# Patient Record
Sex: MALE | Race: WHITE
[De-identification: names, ages, dates, MRNs, and addresses within clinical notes are randomized per-mention and may not be internally consistent; named-entity substitution may affect disease eponyms.]

---

## 2017-01-09 ENCOUNTER — HOSPITAL ENCOUNTER (OUTPATIENT)
Dept: HOSPITAL 17 - PLAB | Age: 82
End: 2017-01-09
Attending: INTERNAL MEDICINE
Payer: MEDICARE

## 2017-01-09 DIAGNOSIS — I35.1: ICD-10-CM

## 2017-01-09 DIAGNOSIS — E78.00: ICD-10-CM

## 2017-01-09 DIAGNOSIS — I44.7: ICD-10-CM

## 2017-01-09 DIAGNOSIS — I11.9: ICD-10-CM

## 2017-01-09 DIAGNOSIS — R53.83: ICD-10-CM

## 2017-01-09 DIAGNOSIS — E78.2: ICD-10-CM

## 2017-01-09 DIAGNOSIS — I48.91: Primary | ICD-10-CM

## 2017-01-09 DIAGNOSIS — I10: ICD-10-CM

## 2017-01-09 DIAGNOSIS — I25.10: ICD-10-CM

## 2017-01-09 LAB
ALP SERPL-CCNC: 77 U/L (ref 45–117)
ALT SERPL-CCNC: 30 U/L (ref 12–78)
ANION GAP SERPL CALC-SCNC: 10 MEQ/L (ref 5–15)
AST SERPL-CCNC: 21 U/L (ref 15–37)
BILIRUB SERPL-MCNC: 0.5 MG/DL (ref 0.2–1)
BUN SERPL-MCNC: 21 MG/DL (ref 7–18)
CHLORIDE SERPL-SCNC: 111 MEQ/L (ref 98–107)
ERYTHROCYTE [DISTWIDTH] IN BLOOD BY AUTOMATED COUNT: 13.9 % (ref 11.6–17.2)
GFR SERPLBLD BASED ON 1.73 SQ M-ARVRAT: 84 ML/MIN (ref 89–?)
HCO3 BLD-SCNC: 26.3 MEQ/L (ref 21–32)
HCT VFR BLD CALC: 42 % (ref 39–51)
HDLC SERPL-MCNC: 66.8 MG/DL (ref 40–60)
LDLC SERPL-MCNC: 43 MG/DL (ref 0–99)
MCH RBC QN AUTO: 31.8 PG (ref 27–34)
MCHC RBC AUTO-ENTMCNC: 33.6 % (ref 32–36)
MCV RBC AUTO: 94.6 FL (ref 80–100)
PLATELET # BLD: 190 TH/MM3 (ref 150–450)
POTASSIUM SERPL-SCNC: 4.3 MEQ/L (ref 3.5–5.1)
RBC # BLD AUTO: 4.44 MIL/MM3 (ref 4.5–5.9)
REVIEW FLAG: (no result)
SODIUM SERPL-SCNC: 147 MEQ/L (ref 136–145)
WBC # BLD AUTO: 6 TH/MM3 (ref 4–11)

## 2017-01-09 PROCEDURE — 80061 LIPID PANEL: CPT

## 2017-01-09 PROCEDURE — 84443 ASSAY THYROID STIM HORMONE: CPT

## 2017-01-09 PROCEDURE — 36415 COLL VENOUS BLD VENIPUNCTURE: CPT

## 2017-01-09 PROCEDURE — 80053 COMPREHEN METABOLIC PANEL: CPT

## 2017-01-09 PROCEDURE — 85027 COMPLETE CBC AUTOMATED: CPT

## 2017-02-07 ENCOUNTER — HOSPITAL ENCOUNTER (INPATIENT)
Dept: HOSPITAL 17 - PHED | Age: 82
LOS: 3 days | Discharge: HOME HEALTH | DRG: 563 | End: 2017-02-10
Attending: SURGERY | Admitting: SURGERY
Payer: MEDICARE

## 2017-02-07 VITALS
RESPIRATION RATE: 16 BRPM | OXYGEN SATURATION: 99 % | HEART RATE: 107 BPM | TEMPERATURE: 97.5 F | DIASTOLIC BLOOD PRESSURE: 68 MMHG | SYSTOLIC BLOOD PRESSURE: 119 MMHG

## 2017-02-07 VITALS
HEART RATE: 87 BPM | DIASTOLIC BLOOD PRESSURE: 73 MMHG | OXYGEN SATURATION: 94 % | SYSTOLIC BLOOD PRESSURE: 119 MMHG | RESPIRATION RATE: 20 BRPM

## 2017-02-07 VITALS
OXYGEN SATURATION: 95 % | HEART RATE: 88 BPM | RESPIRATION RATE: 20 BRPM | DIASTOLIC BLOOD PRESSURE: 62 MMHG | SYSTOLIC BLOOD PRESSURE: 134 MMHG

## 2017-02-07 VITALS — BODY MASS INDEX: 25.63 KG/M2 | HEIGHT: 64 IN | WEIGHT: 150.13 LBS

## 2017-02-07 DIAGNOSIS — I48.91: ICD-10-CM

## 2017-02-07 DIAGNOSIS — Z79.01: ICD-10-CM

## 2017-02-07 DIAGNOSIS — I25.810: ICD-10-CM

## 2017-02-07 DIAGNOSIS — Z95.1: ICD-10-CM

## 2017-02-07 DIAGNOSIS — E86.0: ICD-10-CM

## 2017-02-07 DIAGNOSIS — S27.0XXA: ICD-10-CM

## 2017-02-07 DIAGNOSIS — E03.9: ICD-10-CM
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
## 2017-02-07 DIAGNOSIS — K21.9: ICD-10-CM

## 2017-02-07 DIAGNOSIS — J90: ICD-10-CM

## 2017-02-07 DIAGNOSIS — Z87.442: ICD-10-CM

## 2017-02-07 DIAGNOSIS — I10: ICD-10-CM

## 2017-02-07 DIAGNOSIS — S42.102A: Primary | ICD-10-CM
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
## 2017-02-07 DIAGNOSIS — S22.42XA: ICD-10-CM

## 2017-02-07 LAB
ANION GAP SERPL CALC-SCNC: 11 MEQ/L (ref 5–15)
APTT BLD: 30.6 SEC (ref 24.3–30.1)
BASOPHILS # BLD AUTO: 0.1 TH/MM3 (ref 0–0.2)
BASOPHILS NFR BLD: 0.9 % (ref 0–2)
BUN SERPL-MCNC: 28 MG/DL (ref 7–18)
CHLORIDE SERPL-SCNC: 106 MEQ/L (ref 98–107)
EOSINOPHIL # BLD: 0.1 TH/MM3 (ref 0–0.4)
EOSINOPHIL NFR BLD: 0.9 % (ref 0–4)
ERYTHROCYTE [DISTWIDTH] IN BLOOD BY AUTOMATED COUNT: 13.3 % (ref 11.6–17.2)
GFR SERPLBLD BASED ON 1.73 SQ M-ARVRAT: 57 ML/MIN (ref 89–?)
HCO3 BLD-SCNC: 25.1 MEQ/L (ref 21–32)
HCT VFR BLD CALC: 43.3 % (ref 39–51)
HEMO FLAGS: (no result)
INR PPP: 1.9 RATIO
LYMPHOCYTES # BLD AUTO: 1.1 TH/MM3 (ref 1–4.8)
LYMPHOCYTES NFR BLD AUTO: 8.4 % (ref 9–44)
MCH RBC QN AUTO: 31.8 PG (ref 27–34)
MCHC RBC AUTO-ENTMCNC: 33.5 % (ref 32–36)
MCV RBC AUTO: 95 FL (ref 80–100)
MONOCYTES NFR BLD: 6.8 % (ref 0–8)
NEUTROPHILS # BLD AUTO: 11.2 TH/MM3 (ref 1.8–7.7)
NEUTROPHILS NFR BLD AUTO: 83 % (ref 16–70)
PLATELET # BLD: 228 TH/MM3 (ref 150–450)
POTASSIUM SERPL-SCNC: 5.1 MEQ/L (ref 3.5–5.1)
PROTHROMBIN TIME: 21.2 SEC (ref 9.8–11.6)
RBC # BLD AUTO: 4.56 MIL/MM3 (ref 4.5–5.9)
SODIUM SERPL-SCNC: 142 MEQ/L (ref 136–145)
WBC # BLD AUTO: 13.4 TH/MM3 (ref 4–11)

## 2017-02-07 PROCEDURE — 94667 MNPJ CHEST WALL 1ST: CPT

## 2017-02-07 PROCEDURE — 96375 TX/PRO/DX INJ NEW DRUG ADDON: CPT

## 2017-02-07 PROCEDURE — 73200 CT UPPER EXTREMITY W/O DYE: CPT

## 2017-02-07 PROCEDURE — 94668 MNPJ CHEST WALL SBSQ: CPT

## 2017-02-07 PROCEDURE — 85610 PROTHROMBIN TIME: CPT

## 2017-02-07 PROCEDURE — 73030 X-RAY EXAM OF SHOULDER: CPT

## 2017-02-07 PROCEDURE — 80048 BASIC METABOLIC PNL TOTAL CA: CPT

## 2017-02-07 PROCEDURE — 96376 TX/PRO/DX INJ SAME DRUG ADON: CPT

## 2017-02-07 PROCEDURE — 85025 COMPLETE CBC W/AUTO DIFF WBC: CPT

## 2017-02-07 PROCEDURE — 85730 THROMBOPLASTIN TIME PARTIAL: CPT

## 2017-02-07 PROCEDURE — 94640 AIRWAY INHALATION TREATMENT: CPT

## 2017-02-07 PROCEDURE — 96374 THER/PROPH/DIAG INJ IV PUSH: CPT

## 2017-02-07 PROCEDURE — 71101 X-RAY EXAM UNILAT RIBS/CHEST: CPT

## 2017-02-07 PROCEDURE — 85027 COMPLETE CBC AUTOMATED: CPT

## 2017-02-07 PROCEDURE — 94150 VITAL CAPACITY TEST: CPT

## 2017-02-07 PROCEDURE — 71260 CT THORAX DX C+: CPT

## 2017-02-07 PROCEDURE — 71020: CPT

## 2017-02-07 NOTE — RADHPO
EXAM DATE/TIME:  02/07/2017 14:44 

 

[facility] COMPARISON:     

No previous studies available for comparison.

 

                     

INDICATIONS :     

Left side pain after falling in the yard today.

                     

 

MEDICAL HISTORY :     

None.          

 

SURGICAL HISTORY :     

None.   

 

ENCOUNTER:     

Initial                                        

 

ACUITY:     

1 day      

 

PAIN SCORE:     

8/10

 

LOCATION:     

Left  ribs.

 

FINDINGS:     

Multiple views of the left ribs were performed. There is subtle nondisplaced fractures of left eighth
 and ninth posterior lateral ribs. No destructive lesions or areas of periosteal thickening are seen.
  Pleural calcifications are present. Expiratory view of the chest is negative for pneumothorax.  The
 mediastinal structures are midline.  The patient status post median sternotomy.

 

CONCLUSION:     Subtle nondisplaced fractures of the left eighth and ninth posterior lateral ribs. 

 

 

 Ibrahima Abreu MD on February 07, 2017 at 15:20           

Board Certified Radiologist.

 This report was verified electronically.

## 2017-02-07 NOTE — PD
HPI


Chief Complaint:  Fall


Time Seen by Provider:  17:45


Travel History


International Travel<30 days:  No


Contact w/Intl Traveler<30days:  No


Traveled to known affect area:  No





History of Present Illness


HPI


This 87-year-old male had a fall.  He tripped and landed on his left shoulder 

and chest.  Plain films have shown subtle nondisplaced fractures of the eighth 

and ninth left-sided ribs.  X-ray of the shoulder showed extensive degenerative 

changes.  Because he was having a lot of pain in the shoulder scan has been 

ordered.  The CT scan shows a minimally displaced fracture through the Scapular 

spine.  There is also a small left pneumothorax with a tiny left pleural 

effusion.  Patient is having pain which is aggravated by breathing.  He is not 

complaining of shortness of breath.  He does have a history of atrial 

fibrillation and is on Coumadin.





PFSH


Past Medical History


Hx Anticoagulant Therapy:  Yes (coumadin therapy)


Atrial Fibrillation:  Yes


Cancer:  No


Cardiovascular Problems:  Yes (htn on meds, 5 vessel bypass 1992)


High Cholesterol:  Yes


Coronary Artery Disease:  Yes


Diminished Hearing:  No


Endocrine:  No


GERD:  Yes


Genitourinary:  Yes


Hypertension:  Yes


Implanted Vascular Access Dvce:  No


Kidney Stones:  Yes





Past Surgical History


Cholecystectomy:  Yes


Coronary Artery Bypass Graft:  Yes (5 arteries)


Eye Surgery:  Yes (bilat cataract removal)


Other Surgery:  Yes (testicular sx)





Social History


Alcohol Use:  No


Tobacco Use:  No


Substance Use:  No





Allergies-Medications


(Allergen,Severity, Reaction):  


Coded Allergies:  


     Codeine (Verified  Allergy, Severe, Nausea/Vomiting, 2/7/17)


Reported Meds & Prescriptions





Reported Meds & Active Scripts


Active


Reported


Aspirin 81 Mg Tabdr 81 Mg PO DAILY


Lorazepam 0.5 Mg Tab 1 Mg PO HS PRN


Klor-Con 10 (Potassium Chloride) 10 Meq Tab 10 Meq PO DAILY


Lasix (Furosemide) 20 Mg Tab 20 Mg PO DAILY


Glucosamine (Glucosamine Sulfate) 500 Mg Cap 500 Mg PO DAILY


Ginkgo Biloba 120 Mg Tab 1 Tab PO DAILY


Lysine (Lysine HCl) 500 Mg Tab 1 Tab PO DAILY


Allergy (Loratadine) 10 Mg Tab 1 Tab PO DAILY


Warfarin 4 Mg Tab 4 Mg PO DAILY


Levothyroxine (Levothyroxine Sodium) 25 Mcg Tab 25 Mcg PO DAILY


Toprol XL (Metoprolol Succinate) 50 Mg Tab 50 Mg PO DAILY


Vitamin D (Cholecalciferol) 1,000 Unit Tab 1,000 Units PO DAILY


Amiodarone (Amiodarone HCl) 200 Mg Tab 100 Mg PO 3 TIMES A WEEK


Multi For Him 50+ (Multiple Vitamins W/ Minerals) 1 Tab Tab 1 Tab PO DAILY


Ramipril 10 Mg Cap 10 Mg PO DAILY


Calcium 500 + D (Calcium Carbonate-Vitamin D) 500-125 Mg-Unit Tab 1 Tab PO DAILY


Caduet (Amlodipine-Atorvastatin) 5-40 Mg Tab 1 Tab PO DAILY








Review of Systems


General / Constitutional:  No: Fever, Chills


Eyes:  No: Blurred Vision


HENT:  No: Headaches


Cardiovascular:  Positive: Chest Pain or Discomfort,  No: Palpitations


Respiratory:  Positive: Pleuritic Pain,  No: Shortness of Breath


Gastrointestinal:  No: Vomiting, Diarrhea


Genitourinary:  No: Urgency, Frequency


Musculoskeletal:  No: Myalgias


Skin:  No Rash


Neurologic:  No: Weakness


Endocrine:  No: Heat Intolerance


Hematologic/Lymphatic:  Positive: Easy Bruising





Physical Exam


Narrative


See Dr. Renteria's note.  The patient does have some left-sided chest wall pain.

  His lung sounds are clear.  He has some tenderness in the left posterior 

portion of the scapula





Data


Data


Last Documented VS





Vital Signs








  Date Time  Temp Pulse Resp B/P Pulse Ox O2 Delivery O2 Flow Rate FiO2


 


2/7/17 13:39 97.5 107 16 119/68 99   








Orders





 Iv Access Insert/Monitor (2/7/17 14:36)


Act Partial Throm Time (Ptt) (2/7/17 14:36)


Prothrombin Time / Inr (Pt) (2/7/17 14:36)


Morphine Inj (Morphine Inj) (2/7/17 14:45)


Ondansetron Inj (Zofran Inj) (2/7/17 14:45)


Ribs, Uni (W/Exp Cxr-Min 3vw) (2/7/17 )


Shoulder, Complete (>2vws) (2/7/17 )


Ct Shoulder W/O Contrast (2/7/17 )


Morphine Inj (Morphine Inj) (2/7/17 16:00)


Resp Incentive Spirometry (2/7/17 )


Complete Blood Count With Diff (2/7/17 17:45)


Basic Metabolic Panel (Bmp) (2/7/17 17:45)


Admit Order (Ed Use Only) (2/7/17 17:57)





Labs





 Laboratory Tests








Test 2/7/17





 15:07


 


Prothrombin Time 21.2 SEC


 


Prothromb Time International 1.9 RATIO





Ratio 


 


Activated Partial 30.6 SEC





Thromboplast Time 











MDM


Medical Decision Making


Medical Screen Exam Complete:  Yes


Emergency Medical Condition:  Yes


Medical Record Reviewed:  Yes


Differential Diagnosis


Differential includes rib fracture, pneumothorax, fractured scapula


Narrative Course


Patient has 2 rib fractures apparent on rib films as well as a small apical 

pneumothorax seen on his shoulder CT.  He is alert and appears stable.  He will 

need observation to make sure the pneumothorax does not progress





Diagnosis





 Primary Impression:  


 Pneumothorax, left


 Additional Impressions:  


 Ribs, multiple fractures


 Qualified Code:  S22.42XA - Closed fracture of multiple ribs of left side, 

initial encounter


 Left scapula fracture


 Qualified Code:  S42.192A - Closed fracture of other part of left scapula, 

initial encounter





Admitting Information


Admitting Physician Requests:  Admit








Alistair Michelle MD Feb 7, 2017 17:55

## 2017-02-07 NOTE — RADHPO
EXAM DATE/TIME:  02/07/2017 17:00 

 

[facility] COMPARISON:     

SHOULDER LEFT COMPLETE (>2VWS), February 07, 2017, 14:50.

 

 

INDICATIONS :     

Left shoulder pain post fall.

                      

 

RADIATION DOSE:     

19.61 CTDIvol (mGy) 

 

 

MEDICAL HISTORY :     

Hypertension.   

 

SURGICAL HISTORY :      

CABG   

 

ENCOUNTER:      

Initial

 

ACUITY:      

1 day

 

PAIN SCALE:      

6/10

 

LOCATION:       

Left scapular 

 

TECHNIQUE:     

Volumetric scanning of the shoulder was performed.  Using automated exposure control and adjustment o
f the mA and/or kV according to patient size, radiation dose was kept as low as reasonably achievable
 to obtain optimal diagnostic quality images. 

 

FINDINGS:     

 

BONES:     

Minimally displaced fracture through the scapular spine. There appears to be chronic fracture deformi
ty through the posterior aspect of the left eighth rib.

 

JOINTS:     

Marked narrowing of the acromiohumeral interval characteristic of a chronic rotator cuff injury with 
bone on bone articulation.

 

SOFT TISSUES:     

Muscles, tendons, and neurovascular structures are grossly unremarkable.  The integrity of the rotato
r cuff tendons cannot be reliably evaluated on CT without intra-articular contrast. Patient does have
 a small left-sided effusion and there is a small left-sided pneumothorax as well. Scattered pleural-
based calcifications in the left hemithorax are characteristic of prior asbestos exposure.

 

CONCLUSION:     

1. Minimally displaced fracture through the scapular spine with approximately 3 mm of distraction of 
the fracture fragment. Alignment is otherwise anatomic.

2. Small left pneumothorax with a tiny left pleural effusion.

3. Marked narrowing of the acromiohumeral interval characteristic of a chronic rotator cuff injury.

4. Pleural-based calcifications in the left hemithorax characteristic of prior asbestos exposure.

 

 

 

 Ino Deleon MD on February 07, 2017 at 17:29           

Board Certified Radiologist.

 This report was verified electronically.

## 2017-02-07 NOTE — RADHPO
EXAM DATE/TIME:  02/07/2017 14:50 

 

[facility] COMPARISON:     

No previous studies available for comparison.

 

                     

INDICATIONS :     

Left shoulder pain after falling in the yard today.

                     

 

MEDICAL HISTORY :     

None.          

 

SURGICAL HISTORY :     

None.   

 

ENCOUNTER:     

Initial                                        

 

ACUITY:     

1 day      

 

PAIN SCORE:     

8/10

 

LOCATION:     

Left  shoulder.

 

FINDINGS:     

AP views of the left shoulder were obtained with internal and external rotation as well as Y. view of
 the scapula. This demonstrates chronic rotator cuff degeneration with superior migration of the hume
ral head and narrowing of the acromiohumeral distance. There is sclerosis and eburnation of the acrom
ion. There is diffuse osteopenia with no acute fracture. The soft tissues are unremarkable.

 

CONCLUSION:     

1. Evidence of chronic rotator cuff degeneration as described.

2. Osteopenia with no acute fracture or malalignment.

 

 

 

 Ibrahima Abreu MD on February 07, 2017 at 15:23           

Board Certified Radiologist.

 This report was verified electronically.

## 2017-02-07 NOTE — PD
HPI


Chief Complaint:  Fall


Time Seen by Provider:  14:19


Travel History


International Travel<30 days:  No


Contact w/Intl Traveler<30days:  No


Traveled to known affect area:  No





History of Present Illness


HPI


Patient is an 87-year-old male comes in after a trip and fall today.  He says 

he was working in the yard when he tripped over a sprinkler head and fell onto 

his left side.  He is complaining of pain to his left shoulder and his left 

ribs.  He has a lot of pain with movement of the left shoulder, and pain with 

taking a deep breath.  He took 2 Tylenol at home prior to coming in.  He does 

report taking Coumadin.  He says he did not hit his head.  His family member 

with him says he saw him fall and says that he definitely did not hit his head.

  He denies any headache, neck pain.  He denies having any chest pain or 

dizziness prior to the fall.





PFSH


Past Medical History


Hx Anticoagulant Therapy:  Yes (coumadin therapy)


Atrial Fibrillation:  Yes


Cancer:  No


Cardiovascular Problems:  Yes (htn on meds, 5 vessel bypass 1992)


High Cholesterol:  Yes


Coronary Artery Disease:  Yes


Diminished Hearing:  No


Endocrine:  No


GERD:  Yes


Genitourinary:  Yes


Hypertension:  Yes


Implanted Vascular Access Dvce:  No


Kidney Stones:  Yes





Past Surgical History


Cholecystectomy:  Yes


Coronary Artery Bypass Graft:  Yes (5 arteries)


Eye Surgery:  Yes (bilat cataract removal)


Other Surgery:  Yes (testicular sx)





Social History


Alcohol Use:  No


Tobacco Use:  No


Substance Use:  No





Allergies-Medications


(Allergen,Severity, Reaction):  


Coded Allergies:  


     Codeine (Verified  Allergy, Severe, Nausea/Vomiting, 2/7/17)


Reported Meds & Prescriptions





Reported Meds & Active Scripts


Active


Reported


Aspirin 81 Mg Tabdr 81 Mg PO DAILY


Lorazepam 0.5 Mg Tab 1 Mg PO HS PRN


Klor-Con 10 (Potassium Chloride) 10 Meq Tab 10 Meq PO DAILY


Lasix (Furosemide) 20 Mg Tab 20 Mg PO DAILY


Glucosamine (Glucosamine Sulfate) 500 Mg Cap 500 Mg PO DAILY


Ginkgo Biloba 120 Mg Tab 1 Tab PO DAILY


Lysine (Lysine HCl) 500 Mg Tab 1 Tab PO DAILY


Allergy (Loratadine) 10 Mg Tab 1 Tab PO DAILY


Warfarin 4 Mg Tab 4 Mg PO DAILY


Levothyroxine (Levothyroxine Sodium) 25 Mcg Tab 25 Mcg PO DAILY


Toprol XL (Metoprolol Succinate) 50 Mg Tab 50 Mg PO DAILY


Vitamin D (Cholecalciferol) 1,000 Unit Tab 1,000 Units PO DAILY


Amiodarone (Amiodarone HCl) 200 Mg Tab 100 Mg PO 3 TIMES A WEEK


Multi For Him 50+ (Multiple Vitamins W/ Minerals) 1 Tab Tab 1 Tab PO DAILY


Ramipril 10 Mg Cap 10 Mg PO DAILY


Calcium 500 + D (Calcium Carbonate-Vitamin D) 500-125 Mg-Unit Tab 1 Tab PO DAILY


Caduet (Amlodipine-Atorvastatin) 5-40 Mg Tab 1 Tab PO DAILY








Review of Systems


Except as stated in HPI:  all other systems reviewed are Neg


General / Constitutional:  No: Fever, Chills


Eyes:  No: Blurred Vision


HENT:  No: Headaches, Lightheadedness


Cardiovascular:  No: Chest Pain or Discomfort


Respiratory:  No: Shortness of Breath


Gastrointestinal:  No: Nausea, Vomiting


Musculoskeletal:  Positive: Arthralgias, Limited ROM


Skin:  No Change in Pigmentation


Neurologic:  No: Weakness, Dizziness, Sensory Disturbance





Physical Exam


Narrative


GENERAL: Awake and alert in mild distress due to pain.


SKIN: Warm and dry.


HEAD: Atraumatic. Normocephalic. 


EYES: Pupils equal and round. No scleral icterus. 


ENT:   Mucous membranes pink and moist.


NECK: Trachea midline. No JVD.  No cervical spine tenderness.


CARDIOVASCULAR: Regular rate and rhythm.  No murmur appreciated.  Tender to 

palpation of the posterior chest on the left side.


RESPIRATORY: No accessory muscle use. Clear to auscultation. Breath sounds 

equal bilaterally. 


MUSCULOSKELETAL: Tender to palpation of the left anterior shoulder.  Unable to 

fully abduct the left shoulder.  Radial pulse intact.  Sensation intact to the 

left arm.


NEUROLOGICAL: Awake and alert. No obvious cranial nerve deficits.  Motor 

grossly within normal limits. Normal speech.


PSYCHIATRIC: Appropriate mood and affect; insight and judgment normal.





Data


Data


Last Documented VS





Vital Signs








  Date Time  Temp Pulse Resp B/P Pulse Ox O2 Delivery O2 Flow Rate FiO2


 


2/7/17 13:39 97.5 107 16 119/68 99   








Orders





 Iv Access Insert/Monitor (2/7/17 14:36)


Act Partial Throm Time (Ptt) (2/7/17 14:36)


Prothrombin Time / Inr (Pt) (2/7/17 14:36)


Morphine Inj (Morphine Inj) (2/7/17 14:45)


Ondansetron Inj (Zofran Inj) (2/7/17 14:45)


Ribs, Uni (W/Exp Cxr-Min 3vw) (2/7/17 )


Shoulder, Complete (>2vws) (2/7/17 )


Ct Shoulder W/O Contrast (2/7/17 )


Morphine Inj (Morphine Inj) (2/7/17 16:00)





Labs





 Laboratory Tests








Test 2/7/17





 15:07


 


Prothrombin Time 21.2 SEC


 


Prothromb Time International 1.9 RATIO





Ratio 


 


Activated Partial 30.6 SEC





Thromboplast Time 











MDM


Medical Decision Making


Medical Screen Exam Complete:  Yes


Emergency Medical Condition:  Yes


Medical Record Reviewed:  Yes


Differential Diagnosis


Humeral fracture versus shoulder dislocation versus rib fracture


Narrative Course


Patient is an 87-year-old male comes in after a fall today complaining of left 

shoulder pain and left rib pain.  Exam shows tenderness to the left shoulder as 

well as pain with abduction of the shoulder.  X-ray obtained of the shoulder 

and the ribs.  X-ray shows subtle fractures of the posterior eighth and ninth 

ribs.





Shoulder x-ray shows degenerative changes and rotator cuff injury.  Patient is 

still unable to completely abduct his left shoulder.  CT will be obtained to 

look for any occult fracture.





Patient given morphine for pain with some improvement of symptoms.





Patient signed out to Dr. Michelle to follow up CT and disposition 

appropriately.





Will need incentive spirometer when discharged.








Alethea Mathis MD Feb 7, 2017 15:15

## 2017-02-08 VITALS
OXYGEN SATURATION: 96 % | HEART RATE: 76 BPM | SYSTOLIC BLOOD PRESSURE: 147 MMHG | RESPIRATION RATE: 20 BRPM | DIASTOLIC BLOOD PRESSURE: 73 MMHG

## 2017-02-08 VITALS
DIASTOLIC BLOOD PRESSURE: 72 MMHG | RESPIRATION RATE: 18 BRPM | HEART RATE: 88 BPM | SYSTOLIC BLOOD PRESSURE: 144 MMHG | OXYGEN SATURATION: 95 % | TEMPERATURE: 98 F

## 2017-02-08 VITALS
TEMPERATURE: 98.1 F | DIASTOLIC BLOOD PRESSURE: 63 MMHG | RESPIRATION RATE: 18 BRPM | OXYGEN SATURATION: 96 % | HEART RATE: 74 BPM | SYSTOLIC BLOOD PRESSURE: 139 MMHG

## 2017-02-08 VITALS
DIASTOLIC BLOOD PRESSURE: 77 MMHG | SYSTOLIC BLOOD PRESSURE: 154 MMHG | HEART RATE: 89 BPM | RESPIRATION RATE: 18 BRPM | OXYGEN SATURATION: 92 % | TEMPERATURE: 97 F

## 2017-02-08 VITALS
OXYGEN SATURATION: 94 % | HEART RATE: 80 BPM | SYSTOLIC BLOOD PRESSURE: 139 MMHG | RESPIRATION RATE: 20 BRPM | DIASTOLIC BLOOD PRESSURE: 59 MMHG

## 2017-02-08 VITALS
OXYGEN SATURATION: 95 % | HEART RATE: 78 BPM | SYSTOLIC BLOOD PRESSURE: 136 MMHG | RESPIRATION RATE: 18 BRPM | DIASTOLIC BLOOD PRESSURE: 65 MMHG

## 2017-02-08 VITALS
SYSTOLIC BLOOD PRESSURE: 151 MMHG | OXYGEN SATURATION: 95 % | DIASTOLIC BLOOD PRESSURE: 79 MMHG | HEART RATE: 76 BPM | RESPIRATION RATE: 16 BRPM

## 2017-02-08 VITALS
OXYGEN SATURATION: 96 % | TEMPERATURE: 98.2 F | RESPIRATION RATE: 16 BRPM | HEART RATE: 68 BPM | DIASTOLIC BLOOD PRESSURE: 58 MMHG | SYSTOLIC BLOOD PRESSURE: 130 MMHG

## 2017-02-08 VITALS
OXYGEN SATURATION: 96 % | HEART RATE: 78 BPM | SYSTOLIC BLOOD PRESSURE: 141 MMHG | RESPIRATION RATE: 18 BRPM | DIASTOLIC BLOOD PRESSURE: 74 MMHG

## 2017-02-08 VITALS
DIASTOLIC BLOOD PRESSURE: 88 MMHG | OXYGEN SATURATION: 92 % | RESPIRATION RATE: 20 BRPM | HEART RATE: 71 BPM | SYSTOLIC BLOOD PRESSURE: 166 MMHG

## 2017-02-08 VITALS
HEART RATE: 74 BPM | OXYGEN SATURATION: 94 % | SYSTOLIC BLOOD PRESSURE: 148 MMHG | RESPIRATION RATE: 16 BRPM | DIASTOLIC BLOOD PRESSURE: 58 MMHG

## 2017-02-08 VITALS
OXYGEN SATURATION: 92 % | SYSTOLIC BLOOD PRESSURE: 149 MMHG | HEART RATE: 74 BPM | RESPIRATION RATE: 20 BRPM | DIASTOLIC BLOOD PRESSURE: 77 MMHG

## 2017-02-08 VITALS
RESPIRATION RATE: 18 BRPM | HEART RATE: 75 BPM | SYSTOLIC BLOOD PRESSURE: 142 MMHG | DIASTOLIC BLOOD PRESSURE: 67 MMHG | OXYGEN SATURATION: 95 %

## 2017-02-08 VITALS
SYSTOLIC BLOOD PRESSURE: 145 MMHG | TEMPERATURE: 98 F | DIASTOLIC BLOOD PRESSURE: 71 MMHG | HEART RATE: 72 BPM | OXYGEN SATURATION: 95 % | RESPIRATION RATE: 16 BRPM

## 2017-02-08 VITALS
SYSTOLIC BLOOD PRESSURE: 143 MMHG | HEART RATE: 69 BPM | RESPIRATION RATE: 18 BRPM | OXYGEN SATURATION: 95 % | DIASTOLIC BLOOD PRESSURE: 66 MMHG

## 2017-02-08 VITALS — SYSTOLIC BLOOD PRESSURE: 140 MMHG | DIASTOLIC BLOOD PRESSURE: 66 MMHG

## 2017-02-08 LAB
ANION GAP SERPL CALC-SCNC: 8 MEQ/L (ref 5–15)
APTT BLD: 32 SEC (ref 24.3–30.1)
BUN SERPL-MCNC: 40 MG/DL (ref 7–18)
CHLORIDE SERPL-SCNC: 108 MEQ/L (ref 98–107)
ERYTHROCYTE [DISTWIDTH] IN BLOOD BY AUTOMATED COUNT: 13.9 % (ref 11.6–17.2)
GFR SERPLBLD BASED ON 1.73 SQ M-ARVRAT: 52 ML/MIN (ref 89–?)
HCO3 BLD-SCNC: 26.6 MEQ/L (ref 21–32)
HCT VFR BLD CALC: 40 % (ref 39–51)
INR PPP: 1.7 RATIO
MCH RBC QN AUTO: 31.5 PG (ref 27–34)
MCHC RBC AUTO-ENTMCNC: 33.1 % (ref 32–36)
MCV RBC AUTO: 95.2 FL (ref 80–100)
PLATELET # BLD: 230 TH/MM3 (ref 150–450)
POTASSIUM SERPL-SCNC: 4.7 MEQ/L (ref 3.5–5.1)
PROTHROMBIN TIME: 19 SEC (ref 9.8–11.6)
RBC # BLD AUTO: 4.21 MIL/MM3 (ref 4.5–5.9)
REVIEW FLAG: (no result)
SODIUM SERPL-SCNC: 143 MEQ/L (ref 136–145)
WBC # BLD AUTO: 13 TH/MM3 (ref 4–11)

## 2017-02-08 RX ADMIN — MORPHINE SULFATE PRN MG: 2 INJECTION, SOLUTION INTRAMUSCULAR; INTRAVENOUS at 02:06

## 2017-02-08 RX ADMIN — ACETAMINOPHEN SCH MG: 10 INJECTION, SOLUTION INTRAVENOUS at 21:59

## 2017-02-08 RX ADMIN — SODIUM CHLORIDE, PRESERVATIVE FREE SCH ML: 5 INJECTION INTRAVENOUS at 21:58

## 2017-02-08 RX ADMIN — STANDARDIZED SENNA CONCENTRATE AND DOCUSATE SODIUM SCH TAB: 8.6; 5 TABLET, FILM COATED ORAL at 09:00

## 2017-02-08 RX ADMIN — MORPHINE SULFATE PRN MG: 2 INJECTION, SOLUTION INTRAMUSCULAR; INTRAVENOUS at 23:27

## 2017-02-08 RX ADMIN — MORPHINE SULFATE PRN MG: 2 INJECTION, SOLUTION INTRAMUSCULAR; INTRAVENOUS at 20:03

## 2017-02-08 RX ADMIN — LIDOCAINE SCH PATCH: 50 PATCH CUTANEOUS at 10:22

## 2017-02-08 RX ADMIN — MORPHINE SULFATE PRN MG: 2 INJECTION, SOLUTION INTRAMUSCULAR; INTRAVENOUS at 09:15

## 2017-02-08 RX ADMIN — SODIUM CHLORIDE, PRESERVATIVE FREE SCH ML: 5 INJECTION INTRAVENOUS at 09:00

## 2017-02-08 RX ADMIN — ACETAMINOPHEN SCH MG: 10 INJECTION, SOLUTION INTRAVENOUS at 16:48

## 2017-02-08 RX ADMIN — MORPHINE SULFATE PRN MG: 2 INJECTION, SOLUTION INTRAMUSCULAR; INTRAVENOUS at 05:54

## 2017-02-08 RX ADMIN — ACETAMINOPHEN SCH MG: 10 INJECTION, SOLUTION INTRAVENOUS at 10:23

## 2017-02-08 RX ADMIN — STANDARDIZED SENNA CONCENTRATE AND DOCUSATE SODIUM SCH TAB: 8.6; 5 TABLET, FILM COATED ORAL at 21:58

## 2017-02-08 RX ADMIN — FAMOTIDINE SCH MG: 20 TABLET, FILM COATED ORAL at 21:58

## 2017-02-08 NOTE — RADHPO
EXAM DATE/TIME:  02/08/2017 06:31 

 

[facility] COMPARISON:     

No previous studies available for comparison.

 

                     

INDICATIONS :     

Fall. Follow up. Rib fractures.

                     

 

MEDICAL HISTORY :     

None.          

 

SURGICAL HISTORY :     

None.   

 

ENCOUNTER:     

Subsequent                                        

 

ACUITY:     

3 days      

 

PAIN SCORE:     

8/10

 

LOCATION:     

Bilateral chest 

 

FINDINGS:     

Changes of the spine and extensive atherosclerotic calcifications of the aorta are noted. Cardiomegal
y. No consolidation or effusion. High riding humeral heads are noted bilaterally.

 

CONCLUSION:     No acute disease.  

 

 

 

 Lui Day MD on February 08, 2017 at 6:40           

Board Certified Radiologist.

 This report was verified electronically.

## 2017-02-08 NOTE — RADHPO
EXAM DATE/TIME:  02/08/2017 11:34 

 

[facility] COMPARISON:     

No previous studies available for comparison.

 

 

INDICATIONS:     

Fall. Left chest and shoulder pain.

                      

 

IV CONTRAST:     

60 cc Omnipaque 350 (iohexol) IV 

 

 

RADIATION DOSE:     

11.69 CTDIvol (mGy) 

 

 

MEDICAL HISTORY:      

Hypertension. Cardiovascular disease Hypercholesterolemia.

 

SURGICAL HISTORY:       

CABG Cholecystectomy.

 

ENCOUNTER:      

Initial

 

ACUITY:      

1 day

 

PAIN SCALE:      

5/10

 

LOCATION:       

Left chest 

 

TECHNIQUE:      

Volumetric scanning of the chest was performed.  Using automated exposure control and adjustment of t
he mA and/or kV according to patient size, radiation dose was kept as low as reasonably achievable to
 obtain optimal diagnostic quality images. 

 

FINDINGS:     

Trace pneumothorax is seen on the left.  Calcified pleural plaques are evident.  Minimal bibasilar pa
renchymal 

changes are noted.  

 

There is no axillary adenopathy.  There is no mediastinal adenopathy.  Portion of the liver and splee
n identified 

are free of focal defects. 

 

Review of bone windows reveal degenerative changes about the left shoulder without fracture.  

 

There is no evidence for a rib fracture. 

 

CONCLUSION:     

 

1.  Trace pneumothorax in the left lung base. 

2. Bibasilar parenchymal changes. 

3.  Otherwise negative for acute traumatic injury.  

 

 Theodore Billings MD FACR on February 08, 2017 at 11:59                

Board Certified Radiologist.

 This report was verified electronically.

## 2017-02-08 NOTE — PD.CONS
HPI


Service


[facility] [place]


Consult Requested By


 TIMA hamilton


Reason for Consult


medical mgt in patient with HTN/CABG


Primary Care Physician


Carola Onofre


Diagnoses:  


History of Present Illness


Patient is an 87 year old male with independent ADL's who was out trimming 

trees and lost his balance.  He has several injuries including rib fractures 

and PTX and scapular fx.  He has not had any loss of dizziness.  No recent 

fever or chills and actually has been in good state of health.  He is 

accompanied to the emergency room by his daughter was that the patient has been 

losing his balance lately but overall is pretty independent with his personal 

care and has not had any traumatic falls.  She was admitted to the trauma team 

for further evaluation.  Medicine team was consulted due the patient's history 

of hypertension and coronary artery disease





Review of Systems


Constitutional:  DENIES: Diaphoretic episodes, Fatigue, Fever, Weight gain, 

Weight loss, Chills, Dizziness, Change in appetite, Night Sweats


Endocrine:  DENIES: Heat/cold intolerance, Polydipsia, Polyuria, Polyphagia


Eyes:  DENIES: Blurred vision, Diplopia, Eye inflammation, Eye pain, Vision loss

, Photosensitivity, Double Vision


Ears, nose, mouth, throat:  DENIES: Tinnitus, Hearing loss, Vertigo, Nasal 

discharge, Oral lesions, Throat pain, Hoarseness, Ear Pain, Running Nose, 

Epistaxis, Sinus Pain, Toothache, Odynophagia


Gastrointestinal:  DENIES: Abdominal pain, Black stools, Bloody stools, 

Constipation, Diarrhea, Nausea, Vomiting, Difficulty Swallowing, Anorexia


Musculoskeletal:  DENIES: Joint pain, Muscle aches, Stiffness, Joint Swelling, 

Back pain, Neck pain


Integumentary:  DENIES: Abnormal pigmentation, Nail changes, Pruritus, Rash


Hematologic/lymphatic:  DENIES: Bruising, Lymphadenopathy


Psychiatric:  DENIES: Anxiety, Confusion, Mood changes, Depression, 

Hallucinations, Agitation, Suicidal Ideation, Homicidal Ideation, Delusions





Past Family Social History


Allergies:  


Coded Allergies:  


     Codeine (Verified  Allergy, Severe, Nausea/Vomiting, 2/7/17)


Past Medical History


Hypertension


Cardiac bypass/cardiac disease


Afib/coumadin


Past Surgical History


GB


Cataracts


CABG


Reported Medications


Reviewed in the medical record, none recently changed


Active Ordered Medications


Reviewed in the medical record


Family History


Patient does not recall


Social History


Lives independently, no tobacco or alcohol dependency





Physical Exam


Vital Signs





 Vital Signs








  Date Time  Temp Pulse Resp B/P Pulse Ox O2 Delivery O2 Flow Rate FiO2


 


2/8/17 15:43  74 16  96 Nasal Cannula 2 


 


2/8/17 15:28  74 16 148/58 94 Nasal Cannula 2 


 


2/8/17 14:28 98.2 68 16 130/58 96 Nasal Cannula 2 


 


2/8/17 13:29  69 18 143/66 95 Nasal Cannula 2 


 


2/8/17 12:24  78 16  95 Nasal Cannula 2 


 


2/8/17 10:54   16     


 


2/8/17 10:44 98.0 88 18 144/72 95 Nasal Cannula 2 


 


2/8/17 10:23   16     


 


2/8/17 09:40  78 18 141/74 96 Nasal Cannula 2 


 


2/8/17 08:40  78 18 136/65 95 Nasal Cannula 2 


 


2/8/17 08:30  80 16  96 Nasal Cannula 2 


 


2/8/17 07:40 98.1 74 18 139/63 96 Nasal Cannula 2 


 


2/8/17 06:07  80 20 139/59 94   


 


2/8/17 06:00   20     


 


2/8/17 05:07  76 20 147/73 96   


 


2/8/17 03:09  71 20 166/88 92 Nasal Cannula 2 


 


2/8/17 00:00  74 20 149/77 92 Nasal Cannula 2 


 


2/7/17 21:00  88 20 134/62 95   


 


2/7/17 19:06  87 20  94   


 


2/7/17 19:06  87 20 119/73 94   








Physical Exam


GENERAL: This is a well-nourished, well-developed patient, in no apparent 

distress.


SKIN: No rashes, ecchymoses or lesions. Cool and dry.


HEAD: Atraumatic. Normocephalic. No temporal or scalp tenderness.


EYES: Pupils equal round and reactive. Extraocular motions intact. No scleral 

icterus. No injection or drainage. 


ENT: Nose without bleeding, purulent drainage or septal hematoma. Throat 

without erythema, tonsillar hypertrophy or exudate. Uvula midline. Airway 

patent.


NECK: Trachea midline. No JVD or lymphadenopathy. Supple, nontender, no 

meningeal signs.


CARDIOVASCULAR: Regular rate and rhythm without murmurs, gallops, or rubs. 


RESPIRATORY: Clear to auscultation. Breath sounds equal bilaterally. No wheezes

, rales, or rhonchi.  


GASTROINTESTINAL: Abdomen soft, non-tender, nondistended. No hepato-splenomegaly

, or palpable masses. No guarding.


MUSCULOSKELETAL: Left shoulder sling, other 3 Extremities without clubbing, 

cyanosis, or edema. No joint tenderness, effusion, or edema noted. No calf 

tenderness. Negative Homans sign bilaterally.


NEUROLOGICAL: Awake and alert. Cranial nerves II through XII intact.  Motor and 

sensory grossly within normal limits. Five out of 5 muscle strength in all 

muscle groups.  Normal speech.


Laboratory





Laboratory Tests








Test 2/7/17 2/8/17 2/8/17





 17:56 04:20 10:55


 


White Blood Count 13.4   13.0 


 


Red Blood Count 4.56   4.21 


 


Hemoglobin 14.5   13.3 


 


Hematocrit 43.3   40.0 


 


Mean Corpuscular Volume 95.0   95.2 


 


Mean Corpuscular Hemoglobin 31.8   31.5 


 


Mean Corpuscular Hemoglobin 33.5   33.1 





Concent   


 


Red Cell Distribution Width 13.3   13.9 


 


Platelet Count 228   230 


 


Mean Platelet Volume 7.9   7.6 


 


Neutrophils (%) (Auto) 83.0   


 


Lymphocytes (%) (Auto) 8.4   


 


Monocytes (%) (Auto) 6.8   


 


Eosinophils (%) (Auto) 0.9   


 


Basophils (%) (Auto) 0.9   


 


Neutrophils # (Auto) 11.2   


 


Lymphocytes # (Auto) 1.1   


 


Monocytes # (Auto) 0.9   


 


Eosinophils # (Auto) 0.1   


 


Basophils # (Auto) 0.1   


 


CBC Comment DIFF FINAL   


 


Differential Comment    


 


Sodium Level 142   143 


 


Potassium Level 5.1   4.7 


 


Chloride Level 106   108 


 


Carbon Dioxide Level 25.1   26.6 


 


Anion Gap 11   8 


 


Blood Urea Nitrogen 28   40 


 


Creatinine 1.20   1.30 


 


Estimat Glomerular Filtration 57   52 





Rate   


 


Random Glucose 119   143 


 


Calcium Level 9.4   8.9 


 


Prothrombin Time  19.0  


 


Prothromb Time International  1.7  





Ratio   


 


Activated Partial  32.0  





Thromboplast Time   








Result Diagram:  


2/8/17 1055                                                                    

            2/8/17 1055





Imaging





Last Impressions








Chest X-Ray 2/8/17 0000 Signed





Impressions: 





 Service Date/Time:  Wednesday, February 8, 2017 06:31 - CONCLUSION: No acute 





 disease.       Lui Day MD 


 


Chest CT 2/8/17 0000 Signed





Impressions: 





 Service Date/Time:  Wednesday, February 8, 2017 11:34 - CONCLUSION:   1.  

Trace 





 pneumothorax in the left lung base.  2. Bibasilar parenchymal changes.  3.  





 Otherwise negative for acute traumatic injury.     Theodore Billings MD  FACR


 


Upper Extremity CT 2/7/17 0000 Signed





Impressions: 





 Service Date/Time:  Tuesday, February 7, 2017 17:00 - CONCLUSION:  1. 

Minimally 





 displaced fracture through the scapular spine with approximately 3 mm of 





 distraction of the fracture fragment. Alignment is otherwise anatomic. 2. 

Small 





 left pneumothorax with a tiny left pleural effusion. 3. Marked narrowing of 

the 





 acromiohumeral interval characteristic of a chronic rotator cuff injury. 4. 





 Pleural-based calcifications in the left hemithorax characteristic of prior 





 asbestos exposure.     Ino Deleon MD 


 


Shoulder X-Ray 2/7/17 0000 Signed





Impressions: 





 Service Date/Time:  Tuesday, February 7, 2017 14:50 - CONCLUSION:  1. Evidence 





 of chronic rotator cuff degeneration as described. 2. Osteopenia with no acute 





 fracture or malalignment.     Ibrahima Abreu MD 


 


Ribs X-Ray 2/7/17 0000 Signed





Impressions: 





 Service Date/Time:  Tuesday, February 7, 2017 14:44 - CONCLUSION: Subtle 





 nondisplaced fractures of the left eighth and ninth posterior lateral ribs.   

  





 Ibrahima Abreu MD 











Assessment and Plan


Problem List:  


(1) Traumatic injury


ICD Code:  T14.90


Status:  Acute


Plan:  Continue medical management for now, general surgery follow-up


Patient with scapular fracture, rib fractures and small pneumothorax continue 

IV narcotics for pain management





(2) HTN (hypertension)


ICD Code:  I10


Status:  Acute


Plan:  Stable at this time, continue home medications and pain control





(3) Afib


ICD Code:  I48.91


Status:  Acute


Plan:  Currently rate controlled, hold warfarin, continue home antiarrhythmics





Code Status


Full code


Discussed Condition With


ER RN, patient and daughter








Debra Oliver Sandy SOTO Feb 8, 2017 17:15

## 2017-02-09 VITALS
RESPIRATION RATE: 19 BRPM | OXYGEN SATURATION: 96 % | TEMPERATURE: 96.7 F | SYSTOLIC BLOOD PRESSURE: 95 MMHG | DIASTOLIC BLOOD PRESSURE: 77 MMHG | HEART RATE: 87 BPM

## 2017-02-09 VITALS
OXYGEN SATURATION: 93 % | SYSTOLIC BLOOD PRESSURE: 166 MMHG | HEART RATE: 110 BPM | RESPIRATION RATE: 18 BRPM | DIASTOLIC BLOOD PRESSURE: 73 MMHG | TEMPERATURE: 97.7 F

## 2017-02-09 VITALS
RESPIRATION RATE: 19 BRPM | OXYGEN SATURATION: 92 % | TEMPERATURE: 96.6 F | HEART RATE: 119 BPM | DIASTOLIC BLOOD PRESSURE: 72 MMHG | SYSTOLIC BLOOD PRESSURE: 128 MMHG

## 2017-02-09 VITALS
DIASTOLIC BLOOD PRESSURE: 64 MMHG | SYSTOLIC BLOOD PRESSURE: 148 MMHG | HEART RATE: 79 BPM | TEMPERATURE: 97.8 F | RESPIRATION RATE: 18 BRPM | OXYGEN SATURATION: 92 %

## 2017-02-09 VITALS
OXYGEN SATURATION: 94 % | DIASTOLIC BLOOD PRESSURE: 75 MMHG | HEART RATE: 98 BPM | RESPIRATION RATE: 17 BRPM | TEMPERATURE: 98.5 F | SYSTOLIC BLOOD PRESSURE: 152 MMHG

## 2017-02-09 LAB
ANION GAP SERPL CALC-SCNC: 11 MEQ/L (ref 5–15)
BUN SERPL-MCNC: 32 MG/DL (ref 7–18)
CHLORIDE SERPL-SCNC: 105 MEQ/L (ref 98–107)
ERYTHROCYTE [DISTWIDTH] IN BLOOD BY AUTOMATED COUNT: 13.8 % (ref 11.6–17.2)
GFR SERPLBLD BASED ON 1.73 SQ M-ARVRAT: 70 ML/MIN (ref 89–?)
HCO3 BLD-SCNC: 23 MEQ/L (ref 21–32)
HCT VFR BLD CALC: 38.5 % (ref 39–51)
INR PPP: 1.7 RATIO
MCH RBC QN AUTO: 31.9 PG (ref 27–34)
MCHC RBC AUTO-ENTMCNC: 33.2 % (ref 32–36)
MCV RBC AUTO: 96.3 FL (ref 80–100)
PLATELET # BLD: 172 TH/MM3 (ref 150–450)
POTASSIUM SERPL-SCNC: 4.6 MEQ/L (ref 3.5–5.1)
PROTHROMBIN TIME: 18.8 SEC (ref 9.8–11.6)
RBC # BLD AUTO: 4 MIL/MM3 (ref 4.5–5.9)
REVIEW FLAG: (no result)
SODIUM SERPL-SCNC: 139 MEQ/L (ref 136–145)
WBC # BLD AUTO: 11.1 TH/MM3 (ref 4–11)

## 2017-02-09 RX ADMIN — METHOCARBAMOL SCH MG: 500 TABLET ORAL at 16:23

## 2017-02-09 RX ADMIN — ACETAMINOPHEN SCH MG: 10 INJECTION, SOLUTION INTRAVENOUS at 08:49

## 2017-02-09 RX ADMIN — RAMIPRIL SCH MG: 5 CAPSULE ORAL at 08:48

## 2017-02-09 RX ADMIN — LIDOCAINE SCH PATCH: 50 PATCH CUTANEOUS at 08:49

## 2017-02-09 RX ADMIN — METOPROLOL SUCCINATE SCH MG: 50 TABLET, FILM COATED, EXTENDED RELEASE ORAL at 08:47

## 2017-02-09 RX ADMIN — SODIUM CHLORIDE, PRESERVATIVE FREE SCH ML: 5 INJECTION INTRAVENOUS at 08:59

## 2017-02-09 RX ADMIN — LEVOTHYROXINE SODIUM SCH MCG: 25 TABLET ORAL at 04:53

## 2017-02-09 RX ADMIN — SODIUM CHLORIDE, PRESERVATIVE FREE SCH ML: 5 INJECTION INTRAVENOUS at 21:00

## 2017-02-09 RX ADMIN — MORPHINE SULFATE PRN MG: 2 INJECTION, SOLUTION INTRAMUSCULAR; INTRAVENOUS at 03:08

## 2017-02-09 RX ADMIN — STANDARDIZED SENNA CONCENTRATE AND DOCUSATE SODIUM SCH TAB: 8.6; 5 TABLET, FILM COATED ORAL at 21:40

## 2017-02-09 RX ADMIN — FAMOTIDINE SCH MG: 20 TABLET, FILM COATED ORAL at 21:40

## 2017-02-09 RX ADMIN — MORPHINE SULFATE PRN MG: 2 INJECTION, SOLUTION INTRAMUSCULAR; INTRAVENOUS at 19:03

## 2017-02-09 RX ADMIN — ATORVASTATIN CALCIUM SCH MG: 40 TABLET, FILM COATED ORAL at 08:47

## 2017-02-09 RX ADMIN — FUROSEMIDE SCH MG: 20 TABLET ORAL at 08:47

## 2017-02-09 RX ADMIN — AMIODARONE HYDROCHLORIDE SCH MG: 200 TABLET ORAL at 08:47

## 2017-02-09 RX ADMIN — STANDARDIZED SENNA CONCENTRATE AND DOCUSATE SODIUM SCH TAB: 8.6; 5 TABLET, FILM COATED ORAL at 08:48

## 2017-02-09 RX ADMIN — ACETAMINOPHEN SCH MG: 10 INJECTION, SOLUTION INTRAVENOUS at 03:06

## 2017-02-09 RX ADMIN — MORPHINE SULFATE PRN MG: 2 INJECTION, SOLUTION INTRAMUSCULAR; INTRAVENOUS at 11:21

## 2017-02-09 RX ADMIN — METHOCARBAMOL SCH MG: 500 TABLET ORAL at 21:41

## 2017-02-09 RX ADMIN — FAMOTIDINE SCH MG: 20 TABLET, FILM COATED ORAL at 08:47

## 2017-02-09 RX ADMIN — MAGNESIUM HYDROXIDE SCH ML: 400 SUSPENSION ORAL at 16:00

## 2017-02-09 NOTE — HHI.PR
Subjective


Remarks


This is a note from utilization review committee in response to a possible code 

44. I was asked by case management to see patient because they felt he did not 

meet Medicare criteria for full admission. After review of both the chart as 

well as visiting the patient, it is my opinion that patient definitely meets 

admission criteria. He was admitted by Dr. Petersen the evening of 2/7/17. He 

is an 87-year-old male who was admitted with trauma after losing his balance 

while trimming trees suffered rib fractures on the left with a small left 

pneumothorax and a left pleural effusion. Also fractured his scapula. He has 

medical problems of atrial fibrillation and is on Coumadin for anticoagulation. 

He was admitted and placed on IV pain medication of morphine sulfate and 

consultation with orthopedic service and also medical service. X-rays were 

followed and serial blood counts have been obtained showing the patient losing 

1 and 1/2 units of blood in one and a half days.. Patient is still in extreme 

pain on breathing as well as movement. I visited the patient in room 1713 and 

visualized his pain and discomfort. On admission I believe this patient met the 

2 midnight Medicare rule for proper evaluation and treatment as well as the 

need for intravenous morphine sulfate for pain control. He has had oral 

medication but it has not relieved much of t


his discomfort. Therefore, I feel that full admission was and still is 

warranted.     Signed: Dr. Nae Chacko Jr., MD





Objective





 Vital Signs








  Date Time  Temp Pulse Resp B/P Pulse Ox O2 Delivery O2 Flow Rate FiO2


 


2/9/17 12:00 98.5 98 17 152/75 94   


 


2/9/17 08:00 97.7 110 18 166/73 93   


 


2/9/17 04:00 96.7 87 19 159/77 92   


 


2/9/17 00:00 97.8 79 18 148/64 92   


 


2/9/17 00:00 97.8 79 18 148/64 92   


 


2/8/17 20:05  69 20 140/66 95   


 


2/8/17 20:00 97.0 89 18 154/77 92   


 


2/8/17 19:42      Nasal Cannula 2 


 


2/8/17 19:42  75 18 142/67 95 Nasal Cannula 2 


 


2/8/17 18:44   16  96 Nasal Cannula 2 


 


2/8/17 18:22  76 16 151/79 95 Room Air  








 I/O








 2/8/17 2/8/17 2/8/17 2/9/17 2/9/17 2/9/17





 07:00 15:00 23:00 07:00 15:00 23:00


 


Intake Total  100 ml 460 ml 240 ml 360 ml 


 


Output Total 300 ml     


 


Balance -300 ml 100 ml 460 ml 240 ml 360 ml 


 


      


 


Intake Oral   360 ml 240 ml 360 ml 


 


IV Total  100 ml 100 ml   


 


Output Urine Total 300 ml     


 


# Voids 1  7 2 4 


 


# Bowel Movements     0 








Result Diagram:  


2/9/17 0341 2/9/17 0341











Nae Chacko Jr., MD Feb 9, 2017 18:29

## 2017-02-09 NOTE — HHI.PR
Subjective


Subjective Notes


Reports getting OOB to bathroom unassisted.





Reports Morphine has been effective in reducing his pain





Objective


Vitals/I&O





Vital Signs








  Date Time  Temp Pulse Resp B/P Pulse Ox O2 Delivery O2 Flow Rate FiO2


 


2/9/17 12:00 98.5 98 17 152/75 94   


 


2/8/17 19:42      Nasal Cannula 2 








Labs





Laboratory Tests








Test 2/9/17





 03:41


 


White Blood Count 11.1 


 


Red Blood Count 4.00 


 


Hemoglobin 12.8 


 


Hematocrit 38.5 


 


Mean Corpuscular Volume 96.3 


 


Mean Corpuscular Hemoglobin 31.9 


 


Mean Corpuscular Hemoglobin 33.2 





Concent 


 


Red Cell Distribution Width 13.8 


 


Platelet Count 172 


 


Mean Platelet Volume 8.2 


 


Prothrombin Time 18.8 


 


Prothromb Time International 1.7 





Ratio 


 


Sodium Level 139 


 


Potassium Level 4.6 


 


Chloride Level 105 


 


Carbon Dioxide Level 23.0 


 


Anion Gap 11 


 


Blood Urea Nitrogen 32 


 


Creatinine 1.01 


 


Estimat Glomerular Filtration 70 





Rate 


 


Random Glucose 120 


 


Calcium Level 9.6 








Radiology





Last Impressions








Chest X-Ray 2/8/17 0000 Signed





Impressions: 





 Service Date/Time:  Wednesday, February 8, 2017 06:31 - CONCLUSION: No acute 





 disease.       Lui Day MD 


 


Chest CT 2/8/17 0000 Signed





Impressions: 





 Service Date/Time:  Wednesday, February 8, 2017 11:34 - CONCLUSION:   1.  

Trace 





 pneumothorax in the left lung base.  2. Bibasilar parenchymal changes.  3.  





 Otherwise negative for acute traumatic injury.     Theodore Billings MD  FACR


 


Upper Extremity CT 2/7/17 0000 Signed





Impressions: 





 Service Date/Time:  Tuesday, February 7, 2017 17:00 - CONCLUSION:  1. 

Minimally 





 displaced fracture through the scapular spine with approximately 3 mm of 





 distraction of the fracture fragment. Alignment is otherwise anatomic. 2. 

Small 





 left pneumothorax with a tiny left pleural effusion. 3. Marked narrowing of 

the 





 acromiohumeral interval characteristic of a chronic rotator cuff injury. 4. 





 Pleural-based calcifications in the left hemithorax characteristic of prior 





 asbestos exposure.     Ino Deleon MD 


 


Shoulder X-Ray 2/7/17 0000 Signed





Impressions: 





 Service Date/Time:  Tuesday, February 7, 2017 14:50 - CONCLUSION:  1. Evidence 





 of chronic rotator cuff degeneration as described. 2. Osteopenia with no acute 





 fracture or malalignment.     Ibrahima Abreu MD 


 


Ribs X-Ray 2/7/17 0000 Signed





Impressions: 





 Service Date/Time:  Tuesday, February 7, 2017 14:44 - CONCLUSION: Subtle 





 nondisplaced fractures of the left eighth and ninth posterior lateral ribs.   

  





 Ibrahima Abreu MD 








Narrative Exam


GENERAL: 87 year old well-nourished, well developed male lying in bed.


SKIN: Warm and dry.


NECK: Trachea midline. No JVD. 


CARDIOVASCULAR: Regular rate and rhythm.  


RESPIRATORY: No accessory muscle use. Lungs clear to auscultation. Breath 

sounds equal bilaterally. 


GASTROINTESTINAL: Abdomen soft, non-tender, nondistended. + BS.


MUSCULOSKELETAL: Extremities without cyanosis, or edema. No obvious 

deformities. LEFT arm sling, + sensation, MARTÍNEZ.


NEUROLOGICAL: Awake and alert. Normal speech.





A/P


Assessment and Plan





INJURIES:


LEFT rib fx (8,9)


LEFT scapula fx (non-op)


Small left PTX





Diet: Regular


Pulmonary: IS. Added acapella and EZPAP.


Pain:  Dilaudid 2 PO q6, Lidocaine patch. Added Percocet and Robaxin.


Activity: OOB, PT, OT. (NWB LUE)- sling





GI: Pepcid


Bowel: Carlene-colace. MOM. No BM yet.





[facility]AS consulted for medical management.





Dr. Connolly recommends nonoperative treatment for left scapula fracture.  

Cleared for discharge.





Case management consulted for discharge planning.  Patient likely will be able 

to go home with outpatient Located within [facility] OT.





Attending Statement


patient seen at bedside


rib fx


scapular fx non op ok for dc





Attestation


The exam, history, and the medical decision-making described in the above note 

were completed with the assistance of the mid-level provider. I reviewed and 

agree with the findings presented.  I attest that I had a face-to-face 

encounter with the patient on the same day, and personally performed and 

documented my assessment and findings in the medical record.








Robel Buitrago Feb 9, 2017 15:30


Dillon Walker MD Feb 18, 2017 02:53

## 2017-02-09 NOTE — HHI.PR
Subjective


Remarks


Patient reports that he still having significant pain when he takes deep 

breath.  His pain medication as been adjusted.





Objective


Vitals





 Vital Signs








  Date Time  Temp Pulse Resp B/P Pulse Ox O2 Delivery O2 Flow Rate FiO2


 


2/9/17 12:00 98.5 98 17 152/75 94   


 


2/9/17 08:00 97.7 110 18 166/73 93   


 


2/9/17 04:00 96.7 87 19 159/77 92   


 


2/9/17 00:00 97.8 79 18 148/64 92   


 


2/9/17 00:00 97.8 79 18 148/64 92   


 


2/8/17 20:05  69 20 140/66 95   


 


2/8/17 20:00 97.0 89 18 154/77 92   


 


2/8/17 19:42      Nasal Cannula 2 


 


2/8/17 19:42  75 18 142/67 95 Nasal Cannula 2 


 


2/8/17 18:44   16  96 Nasal Cannula 2 


 


2/8/17 18:22  76 16 151/79 95 Room Air  


 


2/8/17 17:17   16     


 


2/8/17 16:28 98.0 72 16 145/71 95 Nasal Cannula 2 


 


2/8/17 15:43  74 16  96 Nasal Cannula 2 


 


2/8/17 15:28  74 16 148/58 94 Nasal Cannula 2 


 


2/8/17 14:28 98.2 68 16 130/58 96 Nasal Cannula 2 








 I/O








 2/8/17 2/8/17 2/8/17 2/9/17 2/9/17 2/9/17





 07:00 15:00 23:00 07:00 15:00 23:00


 


Intake Total  100 ml 460 ml 240 ml  


 


Output Total 300 ml     


 


Balance -300 ml 100 ml 460 ml 240 ml  


 


      


 


Intake Oral   360 ml 240 ml  


 


IV Total  100 ml 100 ml   


 


Output Urine Total 300 ml     


 


# Voids 1  7 2  








Result Diagram:  


2/9/17 0341                                                                    

            2/9/17 0341





Imaging





Last Impressions








Chest X-Ray 2/8/17 0000 Signed





Impressions: 





 Service Date/Time:  Wednesday, February 8, 2017 06:31 - CONCLUSION: No acute 





 disease.       Lui Day MD 


 


Chest CT 2/8/17 0000 Signed





Impressions: 





 Service Date/Time:  Wednesday, February 8, 2017 11:34 - CONCLUSION:   1.  

Trace 





 pneumothorax in the left lung base.  2. Bibasilar parenchymal changes.  3.  





 Otherwise negative for acute traumatic injury.     Theodore Billings MD  FACR


 


Upper Extremity CT 2/7/17 0000 Signed





Impressions: 





 Service Date/Time:  Tuesday, February 7, 2017 17:00 - CONCLUSION:  1. 

Minimally 





 displaced fracture through the scapular spine with approximately 3 mm of 





 distraction of the fracture fragment. Alignment is otherwise anatomic. 2. 

Small 





 left pneumothorax with a tiny left pleural effusion. 3. Marked narrowing of 

the 





 acromiohumeral interval characteristic of a chronic rotator cuff injury. 4. 





 Pleural-based calcifications in the left hemithorax characteristic of prior 





 asbestos exposure.     Ino Deleon MD 


 


Shoulder X-Ray 2/7/17 0000 Signed





Impressions: 





 Service Date/Time:  Tuesday, February 7, 2017 14:50 - CONCLUSION:  1. Evidence 





 of chronic rotator cuff degeneration as described. 2. Osteopenia with no acute 





 fracture or malalignment.     Ibrahima Abreu MD 


 


Ribs X-Ray 2/7/17 0000 Signed





Impressions: 





 Service Date/Time:  Tuesday, February 7, 2017 14:44 - CONCLUSION: Subtle 





 nondisplaced fractures of the left eighth and ninth posterior lateral ribs.   

  





 Ibrahima Abreu MD 








Objective Remarks


GENERAL: This is a well-nourished, well-developed patient, in no apparent 

distress.


CARDIOVASCULAR: Normal rate and regular rhythm without murmurs, gallops, or 

rubs. 


RESPIRATORY: Good respiratory efforts. Breath sounds equal and clear to 

auscultation bilaterally.


GASTROINTESTINAL: Abdomen soft, non-tender, non-distended. Normal active bowel 

sounds


MUSCULOSKELETAL: Left arm/shoulder in a splint.  Neurovascularly intact at the 

fingers.  Tender to palpation over the left lower ribs anteriorly.


NEURO:  Alert & Oriented x4 to person, place, time, situation.  Moves all ext x4


PSYCH: Appropriate mood and affect.





A/P


Problem List:  


(1) Ribs, multiple fractures


ICD Code:  S22.49XA


Status:  Acute


(2) Left scapula fracture


ICD Code:  S42.102A


Status:  Acute


(3) Traumatic injury


ICD Code:  T14.90


Status:  Acute


Plan:  Continue medical management for now, trauma service following.


Patient with scapular fracture, rib fractures and small pneumothorax continue 

IV narcotics for pain management


Add incentive spirometry.  Discussed with RN to ensure patient's pain is 

controlled





(4) HTN (hypertension)


ICD Code:  I10


Status:  Acute


Plan:  Stable at this time, continue home medications and pain control





(5) Afib


ICD Code:  I48.91


Status:  Acute


Plan:  Currently rate controlled, continue home antiarrhythmics.  Orthopedics 

recommended nonoperative management.  Will resume warfarin.








Problem Qualifiers





(1) Ribs, multiple fractures:  


Qualified Code:  S22.42XA - Closed fracture of multiple ribs of left side, 

initial encounter


(2) Left scapula fracture:  


Qualified Code:  S42.192A - Closed fracture of other part of left scapula, 

initial encounter





Jillian Manning MD Feb 9, 2017 13:33

## 2017-02-10 VITALS
SYSTOLIC BLOOD PRESSURE: 141 MMHG | TEMPERATURE: 96 F | OXYGEN SATURATION: 90 % | RESPIRATION RATE: 16 BRPM | DIASTOLIC BLOOD PRESSURE: 68 MMHG | HEART RATE: 104 BPM

## 2017-02-10 VITALS
DIASTOLIC BLOOD PRESSURE: 74 MMHG | HEART RATE: 119 BPM | TEMPERATURE: 97 F | OXYGEN SATURATION: 90 % | RESPIRATION RATE: 16 BRPM | SYSTOLIC BLOOD PRESSURE: 114 MMHG

## 2017-02-10 VITALS
RESPIRATION RATE: 19 BRPM | TEMPERATURE: 95.9 F | DIASTOLIC BLOOD PRESSURE: 82 MMHG | SYSTOLIC BLOOD PRESSURE: 115 MMHG | HEART RATE: 113 BPM | OXYGEN SATURATION: 92 %

## 2017-02-10 VITALS
DIASTOLIC BLOOD PRESSURE: 77 MMHG | OXYGEN SATURATION: 90 % | SYSTOLIC BLOOD PRESSURE: 148 MMHG | RESPIRATION RATE: 16 BRPM | TEMPERATURE: 96 F | HEART RATE: 121 BPM

## 2017-02-10 LAB
ERYTHROCYTE [DISTWIDTH] IN BLOOD BY AUTOMATED COUNT: 13.6 % (ref 11.6–17.2)
HCT VFR BLD CALC: 37.2 % (ref 39–51)
INR PPP: 1.3 RATIO
MCH RBC QN AUTO: 32.7 PG (ref 27–34)
MCHC RBC AUTO-ENTMCNC: 34.4 % (ref 32–36)
MCV RBC AUTO: 95.2 FL (ref 80–100)
PLATELET # BLD: 176 TH/MM3 (ref 150–450)
PROTHROMBIN TIME: 14.5 SEC (ref 9.8–11.6)
RBC # BLD AUTO: 3.91 MIL/MM3 (ref 4.5–5.9)
REVIEW FLAG: (no result)
WBC # BLD AUTO: 9.9 TH/MM3 (ref 4–11)

## 2017-02-10 RX ADMIN — ATORVASTATIN CALCIUM SCH MG: 40 TABLET, FILM COATED ORAL at 09:26

## 2017-02-10 RX ADMIN — FUROSEMIDE SCH MG: 20 TABLET ORAL at 09:27

## 2017-02-10 RX ADMIN — FAMOTIDINE SCH MG: 20 TABLET, FILM COATED ORAL at 09:27

## 2017-02-10 RX ADMIN — METOPROLOL SUCCINATE SCH MG: 50 TABLET, FILM COATED, EXTENDED RELEASE ORAL at 09:27

## 2017-02-10 RX ADMIN — AMIODARONE HYDROCHLORIDE SCH MG: 200 TABLET ORAL at 09:27

## 2017-02-10 RX ADMIN — METHOCARBAMOL SCH MG: 500 TABLET ORAL at 13:13

## 2017-02-10 RX ADMIN — LEVOTHYROXINE SODIUM SCH MCG: 25 TABLET ORAL at 05:52

## 2017-02-10 RX ADMIN — STANDARDIZED SENNA CONCENTRATE AND DOCUSATE SODIUM SCH TAB: 8.6; 5 TABLET, FILM COATED ORAL at 09:26

## 2017-02-10 RX ADMIN — LIDOCAINE SCH PATCH: 50 PATCH CUTANEOUS at 09:30

## 2017-02-10 RX ADMIN — MORPHINE SULFATE PRN MG: 2 INJECTION, SOLUTION INTRAMUSCULAR; INTRAVENOUS at 01:14

## 2017-02-10 RX ADMIN — SODIUM CHLORIDE, PRESERVATIVE FREE SCH ML: 5 INJECTION INTRAVENOUS at 13:13

## 2017-02-10 RX ADMIN — RAMIPRIL SCH MG: 5 CAPSULE ORAL at 09:26

## 2017-02-10 RX ADMIN — MAGNESIUM HYDROXIDE SCH ML: 400 SUSPENSION ORAL at 09:27

## 2017-02-10 RX ADMIN — METHOCARBAMOL SCH MG: 500 TABLET ORAL at 05:52

## 2017-02-10 NOTE — HHI.PR
Subjective


Remarks


Pain is better controlled today. DW daughter at bedside. He is ambulatory but 

needs assistance.





Objective


Vitals





 Vital Signs








  Date Time  Temp Pulse Resp B/P Pulse Ox O2 Delivery O2 Flow Rate FiO2


 


2/10/17 08:00 96.0 104 16 141/68 90   


 


2/10/17 02:07   18     


 


2/10/17 00:00 95.9 113 19 115/82 92   


 


2/9/17 23:24   16     


 


2/9/17 20:00 96.6 119 19 128/72 92   


 


2/9/17 12:00 98.5 98 17 152/75 94   








 I/O








 2/9/17 2/9/17 2/9/17 2/10/17 2/10/17 2/10/17





 07:00 15:00 23:00 07:00 15:00 23:00


 


Intake Total 240 ml 360 ml 240 ml 240 ml  


 


Output Total    350 ml  


 


Balance 240 ml 360 ml 240 ml -110 ml  


 


      


 


Intake Oral 240 ml 360 ml 240 ml 240 ml  


 


IV Total   0 ml 0 ml  


 


Output Urine Total    350 ml  


 


# Voids 2 4 2 1  


 


# Bowel Movements  0    








Result Diagram:  


2/10/17 0411                                                                   

             2/9/17 0341





Objective Remarks


GENERAL: This is a well-nourished, well-developed patient, in no apparent 

distress.


CARDIOVASCULAR: Normal rate and regular rhythm without murmurs, gallops, or 

rubs. 


RESPIRATORY: Good respiratory efforts. Breath sounds equal and clear to 

auscultation bilaterally.


GASTROINTESTINAL: Abdomen soft, non-tender, non-distended. Normal active bowel 

sounds


MUSCULOSKELETAL: Left arm/shoulder in a splint.  Neurovascularly intact at the 

fingers.  Tender to palpation over the left lower ribs anteriorly.


NEURO:  Alert & Oriented x4 to person, place, time, situation.  Moves all ext x4


PSYCH: Appropriate mood and affect.





A/P


Problem List:  


(1) Ribs, multiple fractures


ICD Code:  S22.49XA


Status:  Acute


(2) Left scapula fracture


ICD Code:  S42.102A


Status:  Acute


(3) Traumatic injury


ICD Code:  T14.90


Status:  Acute


Plan:  Continue medical management for now, trauma service following.


Patient with scapular fracture, rib fractures and small pneumothorax continue 

with pain medications. Minimize IV pain medication use in anticipation for 

discharge home. 


Continue with incentive spirometry. 





(4) HTN (hypertension)


ICD Code:  I10


Status:  Acute


Plan:  Stable at this time, continue home medications and pain control





(5) Afib


ICD Code:  I48.91


Status:  Acute


Plan:  Rate slightly elevated today. Probably related to pain and some mild 

dehydration. He is not eating or drinking much.


Continue home antiarrhythmics. Hold Lasix for now as he appear to be mildly 

dehydrated. Encourage oral fluid intake. Continue warfarin.





Discharge Planning


Stable medically for DC if pain can be controlled with oral medications. I 

discussed with his daughter. He is at risk for setbacks if he does not stay 

active, use incentive spirometry at home. Would advised DC with [facility] and PT. 


DC planning per primary





Problem Qualifiers





(1) Ribs, multiple fractures:  


Qualified Code:  S22.42XA - Closed fracture of multiple ribs of left side, 

initial encounter


(2) Left scapula fracture:  


Qualified Code:  S42.192A - Closed fracture of other part of left scapula, 

initial encounter





Jillian Manning MD Feb 10, 2017 11:01

## 2017-02-10 NOTE — HHI.DS
Discharge Summary


Admission Date


Feb 7, 2017 at 17:59


Discharge Date:  Feb 10, 2017


Admitting Diagnosis


RIB FRACTURES, LEFT PNEUMOTHORAX, FX SCAPULA





Brief History


S/P Trauma: Fall


CBC/BMP:  


2/10/17 0411                                                                   

             2/9/17 0341





Significant Findings





Laboratory Tests








Test 2/7/17 2/8/17 2/8/17 2/9/17





 17:56 04:20 10:55 03:41


 


White Blood Count 13.4 TH/MM3  13.0 TH/MM3 11.1 TH/MM3





 (4.0-11.0)  (4.0-11.0) (4.0-11.0)


 


Neutrophils (%) (Auto) 83.0 %   





 (16.0-70.0)   


 


Lymphocytes (%) (Auto) 8.4 %   





 (9.0-44.0)   


 


Neutrophils # (Auto) 11.2 TH/MM3   





 (1.8-7.7)   


 


Blood Urea Nitrogen 28 MG/DL (7-18)  40 MG/DL (7-18) 32 MG/DL (7-18)


 


Estimat Glomerular Filtration 57 ML/MIN (>89)  52 ML/MIN (>89) 70 ML/MIN (>89)





Rate    


 


Random Glucose 119 MG/DL  143 MG/ MG/DL





 ()  () ()


 


Prothrombin Time  19.0 SEC  18.8 SEC





  (9.8-11.6)  (9.8-11.6)


 


Activated Partial  32.0 SEC  





Thromboplast Time  (24.3-30.1)  


 


Red Blood Count   4.21 MIL/MM3 4.00 MIL/MM3





   (4.50-5.90) (4.50-5.90)


 


Chloride Level   108 MEQ/L 





   () 


 


Hemoglobin    12.8 GM/DL





    (13.0-17.0)


 


Hematocrit    38.5 %





    (39.0-51.0)


 


    





Test 2/10/17   





 04:11   


 


Red Blood Count 3.91 MIL/MM3   





 (4.50-5.90)   


 


Hemoglobin 12.8 GM/DL   





 (13.0-17.0)   


 


Hematocrit 37.2 %   





 (39.0-51.0)   


 


Prothrombin Time 14.5 SEC   





 (9.8-11.6)   








Imaging





Last Impressions








Chest X-Ray 2/8/17 0000 Signed





Impressions: 





 Service Date/Time:  Wednesday, February 8, 2017 06:31 - CONCLUSION: No acute 





 disease.       Lui Day MD 


 


Chest CT 2/8/17 0000 Signed





Impressions: 





 Service Date/Time:  Wednesday, February 8, 2017 11:34 - CONCLUSION:   1.  

Trace 





 pneumothorax in the left lung base.  2. Bibasilar parenchymal changes.  3.  





 Otherwise negative for acute traumatic injury.     Theodore Billings MD  FACR


 


Upper Extremity CT 2/7/17 0000 Signed





Impressions: 





 Service Date/Time:  Tuesday, February 7, 2017 17:00 - CONCLUSION:  1. 

Minimally 





 displaced fracture through the scapular spine with approximately 3 mm of 





 distraction of the fracture fragment. Alignment is otherwise anatomic. 2. 

Small 





 left pneumothorax with a tiny left pleural effusion. 3. Marked narrowing of 

the 





 acromiohumeral interval characteristic of a chronic rotator cuff injury. 4. 





 Pleural-based calcifications in the left hemithorax characteristic of prior 





 asbestos exposure.     Ino Deleno MD 


 


Shoulder X-Ray 2/7/17 0000 Signed





Impressions: 





 Service Date/Time:  Tuesday, February 7, 2017 14:50 - CONCLUSION:  1. Evidence 





 of chronic rotator cuff degeneration as described. 2. Osteopenia with no acute 





 fracture or malalignment.     Ibrahima Abreu MD 


 


Ribs X-Ray 2/7/17 0000 Signed





Impressions: 





 Service Date/Time:  Tuesday, February 7, 2017 14:44 - CONCLUSION: Subtle 





 nondisplaced fractures of the left eighth and ninth posterior lateral ribs.   

  





 Ibrahima Abreu MD 








PE at Discharge


GENERAL: 87 year old well-nourished, well developed male lying in bed.


SKIN: Warm and dry.


NECK: Trachea midline. No JVD. 


CARDIOVASCULAR: Regular rate and rhythm.  


RESPIRATORY: No accessory muscle use. Lungs clear to auscultation. Breath 

sounds equal bilaterally. 


GASTROINTESTINAL: Abdomen soft, non-tender, nondistended. + BS.


MUSCULOSKELETAL: Extremities without cyanosis, or edema. No obvious 

deformities. LEFT arm sling, + sensation, MARTÍNEZ.


NEUROLOGICAL: Awake and alert. Normal speech.


Hospital Course





[place]: Fell over sprinkler head landing on LEFT side. No LOC. Initial complaints 

of left shoulder and left rib pain. On Coumadin INR 1.9 on admission. 





INJURIES:


LEFT rib fx (8,9)


LEFT scapula fx


Small left PTX (resolved)








Diet: Regular, tolerating


Pulmonary: IS, encouraged home use.


Pain: Percocet. Robaxin. Dilaudid 2 PO q6, Lidocaine patch. Pain controlled.


Activity: OOB, PT, OT evaluated. PT recommends home PT ([facility])- maintain 

sling.





GI: Pepcid


Bowel: Pericolace. MOM. 


DVT: SCD





Plan of care discussed patient at bedside.





Follow-up with Ortho as outpatient.





Patient is clear from trauma surgery standpoint to safely discharge home with 

HHC.


Pt Condition on Discharge:  Stable


Discharge Disposition:  Disch w/ Home Health Serv


Discharge Instructions


DIET: Follow Instructions for:  As Tolerated, No Restrictions


Activities you can perform:  See Additionl Instruction


Activities to Avoid:  Weight Bearing, Strenuous Activity


Other Activity Instructions:  


Nonweight bearing to left arm. Wear sling on left arm.








Robel Buitrago Feb 10, 2017 15:57

## 2017-02-10 NOTE — HHI.FF
Face to Face Verification


Diagnosis:  


(1) Traumatic injury


(2) Ribs, multiple fractures


(3) Left scapula fracture


Physical Therapy


Order:  Evaluate and Treat, Improve ambulation, Strength and gait training





Home Health Nursing


Order:     Nursing assessment with vital signs








I have seen patient Nj Cr on 2/10/17. My clinical findings support the 

need for the requested home health care services because:


Ltd mobility - disease progression


Deconditioned w/ increased weakness


Limited ability to care for self








I certify that my clinical findings support that this patient is homebound 

because:


Unsteady gait/balance








Robel Buitrago Feb 10, 2017 13:52

## 2017-07-25 ENCOUNTER — HOSPITAL ENCOUNTER (OUTPATIENT)
Dept: HOSPITAL 17 - PLAB | Age: 82
End: 2017-07-25
Attending: INTERNAL MEDICINE
Payer: MEDICARE

## 2017-07-25 DIAGNOSIS — I11.9: Primary | ICD-10-CM

## 2017-07-25 DIAGNOSIS — I65.23: ICD-10-CM

## 2017-07-25 DIAGNOSIS — I48.0: ICD-10-CM

## 2017-07-25 DIAGNOSIS — I25.10: ICD-10-CM

## 2017-07-25 DIAGNOSIS — E78.00: ICD-10-CM

## 2017-07-25 DIAGNOSIS — R53.81: ICD-10-CM

## 2017-07-25 LAB
ALT SERPL-CCNC: 28 U/L (ref 12–78)
ANION GAP SERPL CALC-SCNC: 7 MEQ/L (ref 5–15)
AST SERPL-CCNC: 16 U/L (ref 15–37)
BUN SERPL-MCNC: 26 MG/DL (ref 7–18)
CHLORIDE SERPL-SCNC: 111 MEQ/L (ref 98–107)
GFR SERPLBLD BASED ON 1.73 SQ M-ARVRAT: 71 ML/MIN (ref 89–?)
HCO3 BLD-SCNC: 26.4 MEQ/L (ref 21–32)
HDLC SERPL-MCNC: 60.8 MG/DL (ref 40–60)
LDLC SERPL-MCNC: 36 MG/DL (ref 0–99)
POTASSIUM SERPL-SCNC: 4 MEQ/L (ref 3.5–5.1)
SODIUM SERPL-SCNC: 144 MEQ/L (ref 136–145)

## 2017-07-25 PROCEDURE — 80048 BASIC METABOLIC PNL TOTAL CA: CPT

## 2017-07-25 PROCEDURE — 80061 LIPID PANEL: CPT

## 2017-07-25 PROCEDURE — 84450 TRANSFERASE (AST) (SGOT): CPT

## 2017-07-25 PROCEDURE — 84460 ALANINE AMINO (ALT) (SGPT): CPT

## 2017-07-25 PROCEDURE — 84443 ASSAY THYROID STIM HORMONE: CPT

## 2017-07-25 PROCEDURE — 36415 COLL VENOUS BLD VENIPUNCTURE: CPT

## 2017-12-17 ENCOUNTER — HOSPITAL ENCOUNTER (EMERGENCY)
Dept: HOSPITAL 17 - NEPE | Age: 82
Discharge: HOME | End: 2017-12-17
Payer: MEDICARE

## 2017-12-17 VITALS — DIASTOLIC BLOOD PRESSURE: 78 MMHG | TEMPERATURE: 97.8 F | SYSTOLIC BLOOD PRESSURE: 130 MMHG

## 2017-12-17 VITALS
HEART RATE: 55 BPM | RESPIRATION RATE: 16 BRPM | DIASTOLIC BLOOD PRESSURE: 72 MMHG | TEMPERATURE: 98.9 F | SYSTOLIC BLOOD PRESSURE: 167 MMHG | OXYGEN SATURATION: 95 %

## 2017-12-17 VITALS — BODY MASS INDEX: 26.35 KG/M2 | WEIGHT: 154.32 LBS | HEIGHT: 64 IN

## 2017-12-17 DIAGNOSIS — I48.91: ICD-10-CM

## 2017-12-17 DIAGNOSIS — N20.0: ICD-10-CM

## 2017-12-17 DIAGNOSIS — R11.2: ICD-10-CM

## 2017-12-17 DIAGNOSIS — I10: ICD-10-CM

## 2017-12-17 DIAGNOSIS — M79.1: ICD-10-CM

## 2017-12-17 DIAGNOSIS — K21.9: ICD-10-CM

## 2017-12-17 DIAGNOSIS — R31.9: ICD-10-CM

## 2017-12-17 DIAGNOSIS — I25.10: Primary | ICD-10-CM

## 2017-12-17 DIAGNOSIS — E78.00: ICD-10-CM

## 2017-12-17 LAB
ALBUMIN SERPL-MCNC: 3.9 GM/DL (ref 3.4–5)
ALP SERPL-CCNC: 80 U/L (ref 45–117)
ALT SERPL-CCNC: 37 U/L (ref 12–78)
AST SERPL-CCNC: 21 U/L (ref 15–37)
BACTERIA #/AREA URNS HPF: (no result) /HPF
BASOPHILS # BLD AUTO: 0 TH/MM3 (ref 0–0.2)
BASOPHILS NFR BLD: 0.2 % (ref 0–2)
BILIRUB SERPL-MCNC: 0.5 MG/DL (ref 0.2–1)
BUN SERPL-MCNC: 22 MG/DL (ref 7–18)
CALCIUM SERPL-MCNC: 9.3 MG/DL (ref 8.5–10.1)
CHLORIDE SERPL-SCNC: 110 MEQ/L (ref 98–107)
COLOR UR: YELLOW
CREAT SERPL-MCNC: 1.17 MG/DL (ref 0.6–1.3)
EOSINOPHIL # BLD: 0 TH/MM3 (ref 0–0.4)
EOSINOPHIL NFR BLD: 0 % (ref 0–4)
ERYTHROCYTE [DISTWIDTH] IN BLOOD BY AUTOMATED COUNT: 14.2 % (ref 11.6–17.2)
GFR SERPLBLD BASED ON 1.73 SQ M-ARVRAT: 59 ML/MIN (ref 89–?)
GLUCOSE SERPL-MCNC: 148 MG/DL (ref 74–106)
GLUCOSE UR STRIP-MCNC: 300 MG/DL
HCO3 BLD-SCNC: 24.2 MEQ/L (ref 21–32)
HCT VFR BLD CALC: 39.2 % (ref 39–51)
HGB BLD-MCNC: 13.8 GM/DL (ref 13–17)
HGB UR QL STRIP: (no result)
HYALINE CASTS #/AREA URNS LPF: 1 /LPF
KETONES UR STRIP-MCNC: (no result) MG/DL
LYMPHOCYTES # BLD AUTO: 0.7 TH/MM3 (ref 1–4.8)
LYMPHOCYTES NFR BLD AUTO: 6.5 % (ref 9–44)
MCH RBC QN AUTO: 33.4 PG (ref 27–34)
MCHC RBC AUTO-ENTMCNC: 35.2 % (ref 32–36)
MCV RBC AUTO: 94.7 FL (ref 80–100)
MONOCYTE #: 0.5 TH/MM3 (ref 0–0.9)
MONOCYTES NFR BLD: 4.5 % (ref 0–8)
MUCOUS THREADS #/AREA URNS LPF: (no result) /LPF
NEUTROPHILS # BLD AUTO: 9.8 TH/MM3 (ref 1.8–7.7)
NEUTROPHILS NFR BLD AUTO: 88.8 % (ref 16–70)
NITRITE UR QL STRIP: (no result)
PLATELET # BLD: 197 TH/MM3 (ref 150–450)
PMV BLD AUTO: 8.3 FL (ref 7–11)
PROT SERPL-MCNC: 7.1 GM/DL (ref 6.4–8.2)
RBC # BLD AUTO: 4.14 MIL/MM3 (ref 4.5–5.9)
SODIUM SERPL-SCNC: 142 MEQ/L (ref 136–145)
SP GR UR STRIP: 1.02 (ref 1–1.03)
URINE LEUKOCYTE ESTERASE: (no result)
WBC # BLD AUTO: 11 TH/MM3 (ref 4–11)

## 2017-12-17 PROCEDURE — 85025 COMPLETE CBC W/AUTO DIFF WBC: CPT

## 2017-12-17 PROCEDURE — 99285 EMERGENCY DEPT VISIT HI MDM: CPT

## 2017-12-17 PROCEDURE — 80053 COMPREHEN METABOLIC PANEL: CPT

## 2017-12-17 PROCEDURE — 81001 URINALYSIS AUTO W/SCOPE: CPT

## 2017-12-17 PROCEDURE — 96374 THER/PROPH/DIAG INJ IV PUSH: CPT

## 2017-12-17 PROCEDURE — 96375 TX/PRO/DX INJ NEW DRUG ADDON: CPT

## 2017-12-17 PROCEDURE — 74176 CT ABD & PELVIS W/O CONTRAST: CPT

## 2017-12-17 NOTE — RADRPT
EXAM DATE/TIME:  12/17/2017 15:40 

 

[facility] COMPARISON:     

No previous studies available for comparison.

 

 

INDICATIONS :     

Right sided pain with burning and frequency of urination.

                  

 

ORAL CONTRAST:      

No oral contrast ingested.

                  

 

RADIATION DOSE:     

7.29 CTDIvol (mGy) 

 

 

MEDICAL HISTORY :     

Cardiovascular disease. Hypertension. Gastroesophageal reflux disease.afib

 

SURGICAL HISTORY :      

CABG Testicular 

 

ENCOUNTER:      

Initial

 

ACUITY:      

1 day

 

PAIN SCALE:      

5/10

 

LOCATION:       

Right flank 

 

TECHNIQUE:     

Volumetric scanning of the abdomen and pelvis was performed.  Using automated exposure control and ad
justment of the mA and/or kV according to patient size, radiation dose was kept as low as reasonably 
achievable to obtain optimal diagnostic quality images.  DICOM format image data is available electro
nically for review and comparison.  

 

FINDINGS:     

 

LOWER LUNGS:     

The visualized lower lungs are clear.

 

LIVER:     

Homogeneous density without lesion.  There is no dilation of the biliary tree. Cholecystectomy clips.


 

SPLEEN:     

Normal size without lesion.

 

PANCREAS:     

Within normal limits. 

 

KIDNEYS:     

There are no dilatation scattered throughout both kidneys. Neither collecting system is dilated..

 

ADRENAL GLANDS:     

Within normal limits.

 

VASCULAR:     

Dense coronary atherosclerotic disease..

 

BOWEL/MESENTERY:     

The stomach, small bowel, and colon demonstrate no acute abnormality.  There is no free intraperitone
al air or fluid. 

 

ABDOMINAL WALL:     

Within normal limits.

 

RETROPERITONEUM:     

There is no lymphadenopathy.

 

BLADDER:     

Sulkowski's left of the bladder could be recently passed right ureteral stone based on the patient's 
symptoms.

 

REPRODUCTIVE:     

Within normal limits.

 

INGUINAL:     

There is no lymphadenopathy or hernia.

 

MUSCULOSKELETAL:     

Within normal limits for patient age.

 

CONCLUSION:     

2 mm calcification probable stone left side of the bladder. Numerous stones overlie each kidney witho
ut any symptomatic ureteral stones identified..

 

 

 

 Bakari Montelongo MD on December 17, 2017 at 16:02           

Board Certified Radiologist.

 This report was verified electronically.

## 2017-12-17 NOTE — PD
HPI


Chief Complaint:  Flank/Kidney Pain


Time Seen by Provider:  14:14


Travel History


International Travel<30 days:  No


Contact w/Intl Traveler<30days:  No


Traveled to known affect area:  No





History of Present Illness


HPI


Patient is a-year-old male presenting to the emergency department evaluation of 

right lower back, flank pain.  Patient states it started this morning when he 

woke up.  He states the pain is throbbing, a 6 out of 10.  While he was driving 

to the hospital he states he vomited.  The pain is worse with breathing, 

movement does not exacerbate it.  He also reports dysuria and frequency but 

states is a chronic issue.  He has a history of kidney stones remotely.  

Patient denies any chest pain, shortness of breath, abdominal pain, fevers.  He 

denies any injury or trauma to his back.





PFSH


Past Medical History


Hx Anticoagulant Therapy:  Yes (coumadin therapy)


Atrial Fibrillation:  Yes


Heart Rhythm Problems:  Yes (AF on coumadin)


Cardiovascular Problems:  Yes


High Cholesterol:  Yes


Coronary Artery Disease:  Yes


Diminished Hearing:  No


Endocrine:  No


Gastrointestinal Disorders:  Yes


GERD:  Yes


Genitourinary:  Yes


Hypertension:  Yes (controlled on meds)


Immune Disorder:  No


Implanted Vascular Access Dvce:  No


Kidney Stones:  Yes


Musculoskeletal:  Yes


Neurologic:  Yes


Psychiatric:  No


Reproductive:  No


Respiratory:  No


Thyroid Disease:  Yes (on meds unsure of name)


Tetanus Vaccination:  > 5 Years


Influenza Vaccination:  Yes





Past Surgical History


Abdominal Surgery:  No


Cardiac Surgery:  Yes (5 vessel bypass more than 20 years ago)


Cholecystectomy:  Yes


Coronary Artery Bypass Graft:  Yes (5 arteries)


Eye Surgery:  Yes (bilat cataract removal)


Other Surgery:  Yes (testicular sx)





Social History


Alcohol Use:  No


Tobacco Use:  No


Substance Use:  No





Allergies-Medications


(Allergen,Severity, Reaction):  


Coded Allergies:  


     codeine (Unverified  Allergy, Severe, Nausea/Vomiting, 12/17/17)


Reported Meds & Prescriptions





Reported Meds & Active Scripts


Active


Reported


Lutein 20 Mg Cap 20 Mg PO DAILY


Tramadol (Tramadol HCl) 50 Mg Tab 50 Mg PO Q6H PRN


Lorazepam 0.5 Mg Tab 1 Mg PO HS PRN


Klor-Con 10 (Potassium Chloride) 10 Meq Tab 10 Meq PO DAILY


Glucosamine (Glucosamine Sulfate) 500 Mg Cap 500 Mg PO DAILY


Lysine (Lysine HCl) 500 Mg Tab 1 Tab PO DAILY


Warfarin 4 Mg Tab 4 Mg PO DAILY


Levothyroxine (Levothyroxine Sodium) 25 Mcg Tab 50 Mcg PO DAILY


Toprol XL (Metoprolol Succinate) 50 Mg Tab 50 Mg PO DAILY


Vitamin D3 (Cholecalciferol) 1,000 Unit Tab 1,000 Units PO DAILY


Amiodarone (Amiodarone HCl) 200 Mg Tab 100 Mg PO 3 TIMES A WEEK


Ramipril 10 Mg Cap 10 Mg PO DAILY








Review of Systems


Except as stated in HPI:  all other systems reviewed are Neg


General / Constitutional:  No: Fever, Chills


HENT:  No: Headaches


Cardiovascular:  No: Chest Pain or Discomfort


Gastrointestinal:  Positive: Nausea, Vomiting, No: Abdominal Pain


Genitourinary:  Positive: Frequency, Dysuria, Flank Pain


Musculoskeletal:  Positive: Myalgias





Physical Exam


Narrative


GENERAL: Well-developed, well-nourished, alert elderly male.  Resting 

comfortably in no acute distress.


SKIN: Warm and dry.


HEAD: Atraumatic. Normocephalic. 


EYES: Pupils equal and round. No scleral icterus. No injection or drainage. 


ENT: No nasal bleeding or discharge.  Mucous membranes pink and moist.


NECK: Trachea midline. No JVD. 


CARDIOVASCULAR: Regular rate and rhythm.  


RESPIRATORY: No accessory muscle use. Clear to auscultation. Breath sounds 

equal bilaterally. 


GASTROINTESTINAL: Abdomen soft, non-tender, nondistended. Hepatic and splenic 

margins not palpable.  Positive bowel sounds, no rebound, no guarding.


MUSCULOSKELETAL: Extremities without clubbing, cyanosis, or edema. No obvious 

deformities.  No CVAT bilaterally. TTP right flank


NEUROLOGICAL: Awake and alert. No obvious cranial nerve deficits.  Motor 

grossly within normal limits. Five out of 5 muscle strength in the arms and 

legs.  Normal speech.


PSYCHIATRIC: Appropriate mood and affect; insight and judgment normal.





Data


Data


Last Documented VS





Vital Signs








  Date Time  Temp Pulse Resp B/P (MAP) Pulse Ox O2 Delivery O2 Flow Rate FiO2


 


12/17/17 15:32   16     


 


12/17/17 14:15  89      


 


12/17/17 12:51 98.9   167/72 (103) 95   








Orders





 Orders


Complete Blood Count With Diff (12/17/17 14:15)


Comprehensive Metabolic Panel (12/17/17 14:15)


Urinalysis - C+S If Indicated (12/17/17 14:15)


Ct Abd/Pel W/O Iv Contrast (12/17/17 14:15)


Ecg Monitoring (12/17/17 14:15)


Iv Access Insert/Monitor (12/17/17 14:15)


Ketorolac Inj (Toradol Inj) (12/17/17 14:15)


Ondansetron Inj (Zofran Inj) (12/17/17 14:15)


Sodium Chloride 0.9% Flush (Ns Flush) (12/17/17 14:15)


Ed Discharge Order (12/17/17 16:45)





Labs





Laboratory Tests








Test


  12/17/17


14:30 12/17/17


14:53


 


White Blood Count 11.0 TH/MM3  


 


Red Blood Count 4.14 MIL/MM3  


 


Hemoglobin 13.8 GM/DL  


 


Hematocrit 39.2 %  


 


Mean Corpuscular Volume 94.7 FL  


 


Mean Corpuscular Hemoglobin 33.4 PG  


 


Mean Corpuscular Hemoglobin


Concent 35.2 % 


  


 


 


Red Cell Distribution Width 14.2 %  


 


Platelet Count 197 TH/MM3  


 


Mean Platelet Volume 8.3 FL  


 


Neutrophils (%) (Auto) 88.8 %  


 


Lymphocytes (%) (Auto) 6.5 %  


 


Monocytes (%) (Auto) 4.5 %  


 


Eosinophils (%) (Auto) 0.0 %  


 


Basophils (%) (Auto) 0.2 %  


 


Neutrophils # (Auto) 9.8 TH/MM3  


 


Lymphocytes # (Auto) 0.7 TH/MM3  


 


Monocytes # (Auto) 0.5 TH/MM3  


 


Eosinophils # (Auto) 0.0 TH/MM3  


 


Basophils # (Auto) 0.0 TH/MM3  


 


CBC Comment DIFF FINAL  


 


Differential Comment   


 


Blood Urea Nitrogen 22 MG/DL  


 


Creatinine 1.17 MG/DL  


 


Random Glucose 148 MG/DL  


 


Total Protein 7.1 GM/DL  


 


Albumin 3.9 GM/DL  


 


Calcium Level 9.3 MG/DL  


 


Alkaline Phosphatase 80 U/L  


 


Aspartate Amino Transf


(AST/SGOT) 21 U/L 


  


 


 


Alanine Aminotransferase


(ALT/SGPT) 37 U/L 


  


 


 


Total Bilirubin 0.5 MG/DL  


 


Sodium Level 142 MEQ/L  


 


Potassium Level 4.6 MEQ/L  


 


Chloride Level 110 MEQ/L  


 


Carbon Dioxide Level 24.2 MEQ/L  


 


Anion Gap 8 MEQ/L  


 


Estimat Glomerular Filtration


Rate 59 ML/MIN 


  


 


 


Urine Color  YELLOW 


 


Urine Turbidity  HAZY 


 


Urine pH  5.5 


 


Urine Specific Gravity  1.022 


 


Urine Protein  30 mg/dL 


 


Urine Glucose (UA)  300 mg/dL 


 


Urine Ketones  NEG mg/dL 


 


Urine Occult Blood  LARGE 


 


Urine Nitrite  NEG 


 


Urine Bilirubin  NEG 


 


Urine Urobilinogen


  


  LESS THAN 2.0


MG/DL


 


Urine Leukocyte Esterase  NEG 


 


Urine RBC   /hpf 


 


Urine WBC  1 /hpf 


 


Urine Bacteria  OCC /hpf 


 


Urine Hyaline Casts  1 /lpf 


 


Urine Mucus  MANY /lpf 


 


Microscopic Urinalysis Comment


  


  CULT NOT


INDICATED











MDM


Medical Decision Making


Medical Screen Exam Complete:  Yes


Emergency Medical Condition:  Yes


Medical Record Reviewed:  Yes


Interpretation(s)





Last Impressions








Abdomen/Pelvis CT 12/17/17 1415 Signed





Impressions: 





 Service Date/Time:  Sunday, December 17, 2017 15:40 - CONCLUSION:  2 mm 





 calcification probable stone left side of the bladder. Numerous stones overlie 





 each kidney without any symptomatic ureteral stones identified..     Bakari Montelongo MD 








Laboratory Tests








Test


  12/17/17


14:30 12/17/17


14:53


 


White Blood Count 11.0 TH/MM3  


 


Red Blood Count 4.14 MIL/MM3  


 


Hemoglobin 13.8 GM/DL  


 


Hematocrit 39.2 %  


 


Mean Corpuscular Volume 94.7 FL  


 


Mean Corpuscular Hemoglobin 33.4 PG  


 


Mean Corpuscular Hemoglobin


Concent 35.2 % 


  


 


 


Red Cell Distribution Width 14.2 %  


 


Platelet Count 197 TH/MM3  


 


Mean Platelet Volume 8.3 FL  


 


Neutrophils (%) (Auto) 88.8 %  


 


Lymphocytes (%) (Auto) 6.5 %  


 


Monocytes (%) (Auto) 4.5 %  


 


Eosinophils (%) (Auto) 0.0 %  


 


Basophils (%) (Auto) 0.2 %  


 


Neutrophils # (Auto) 9.8 TH/MM3  


 


Lymphocytes # (Auto) 0.7 TH/MM3  


 


Monocytes # (Auto) 0.5 TH/MM3  


 


Eosinophils # (Auto) 0.0 TH/MM3  


 


Basophils # (Auto) 0.0 TH/MM3  


 


CBC Comment DIFF FINAL  


 


Differential Comment   


 


Blood Urea Nitrogen 22 MG/DL  


 


Creatinine 1.17 MG/DL  


 


Random Glucose 148 MG/DL  


 


Total Protein 7.1 GM/DL  


 


Albumin 3.9 GM/DL  


 


Calcium Level 9.3 MG/DL  


 


Alkaline Phosphatase 80 U/L  


 


Aspartate Amino Transf


(AST/SGOT) 21 U/L 


  


 


 


Alanine Aminotransferase


(ALT/SGPT) 37 U/L 


  


 


 


Total Bilirubin 0.5 MG/DL  


 


Sodium Level 142 MEQ/L  


 


Potassium Level 4.6 MEQ/L  


 


Chloride Level 110 MEQ/L  


 


Carbon Dioxide Level 24.2 MEQ/L  


 


Anion Gap 8 MEQ/L  


 


Estimat Glomerular Filtration


Rate 59 ML/MIN 


  


 


 


Urine Color  YELLOW 


 


Urine Turbidity  HAZY 


 


Urine pH  5.5 


 


Urine Specific Gravity  1.022 


 


Urine Protein  30 mg/dL 


 


Urine Glucose (UA)  300 mg/dL 


 


Urine Ketones  NEG mg/dL 


 


Urine Occult Blood  LARGE 


 


Urine Nitrite  NEG 


 


Urine Bilirubin  NEG 


 


Urine Urobilinogen


  


  LESS THAN 2.0


MG/DL


 


Urine Leukocyte Esterase  NEG 


 


Urine RBC   /hpf 


 


Urine WBC  1 /hpf 


 


Urine Bacteria  OCC /hpf 


 


Urine Hyaline Casts  1 /lpf 


 


Urine Mucus  MANY /lpf 


 


Microscopic Urinalysis Comment


  


  CULT NOT


INDICATED








Vital Signs








  Date Time  Temp Pulse Resp B/P (MAP) Pulse Ox O2 Delivery O2 Flow Rate FiO2


 


12/17/17 14:15  89 16     


 


12/17/17 12:51 98.9 55 16 167/72 (103) 95   








Differential Diagnosis


UTI versus kidney stone versus pyelonephritis versus metabolic abnormality 

versus muscle spasm versus other


Narrative Course


Patient is an 88-year-old male that presented to emergency department 

evaluation of right lower back/flank pain.  Patient's vital signs are stable, 

he is well-appearing.  Labs and imaging ordered and pending.


CBC is unremarkable


Chemistry the BUN 22, otherwise unremarkable.


Urinalysis was large occult blood and innumerable red blood cells


CT scan abdomen and pelvis which was read by the radiologist shows 2 mm 

calcification probable stone left side of the bladder. Numerous stones overlie 

each kidney without any symptomatic ureteral stones identified.  Patient likely 

passed kidney stone.  Patient reports feeling better after administration of 

Toradol.  At this time patient will be discharged home, encouraged follow-up 

with his primary doctor return to emergency department for any new or worsening 

symptoms.  He was reassured this time that there were no acute findings.  He 

was encouraged to take over-the-counter acetaminophen as needed and as directed 

for pain.  Patient daughter verbalized understanding of discharge instructions.

  Patient stable for discharge.





Diagnosis





 Primary Impression:  


 Flank pain


 Additional Impression:  


 Hematuria


 Qualified Codes:  R31.9 - Hematuria, unspecified


Referrals:  


Primary Care Physician


2 days


Patient Instructions:  Flank Pain (ED), General Instructions, Kidney Stones (DC)





***Additional Instructions:  


Follow-up with your primary doctor


Increase oral fluid intake


Take acetaminophen as needed and as directed for pain


Return to emergency department for any new or worsening symptoms


***Med/Other Pt SpecificInfo:  No Change to Meds


Disposition:  01 DISCHARGE HOME


Condition:  Stable











Jaylin Lincoln Dec 17, 2017 14:57

## 2018-01-11 ENCOUNTER — HOSPITAL ENCOUNTER (OUTPATIENT)
Dept: HOSPITAL 17 - PLAB | Age: 83
End: 2018-01-11
Attending: INTERNAL MEDICINE
Payer: MEDICARE

## 2018-01-11 DIAGNOSIS — E78.2: Primary | ICD-10-CM

## 2018-01-11 DIAGNOSIS — Z79.899: ICD-10-CM

## 2018-01-11 DIAGNOSIS — I10: ICD-10-CM

## 2018-01-11 DIAGNOSIS — I65.23: ICD-10-CM

## 2018-01-11 DIAGNOSIS — I48.0: ICD-10-CM

## 2018-01-11 DIAGNOSIS — R42: ICD-10-CM

## 2018-01-11 DIAGNOSIS — I95.2: ICD-10-CM

## 2018-01-11 DIAGNOSIS — E78.00: ICD-10-CM

## 2018-01-11 LAB
ALBUMIN SERPL-MCNC: 3.5 GM/DL (ref 3.4–5)
ALP SERPL-CCNC: 76 U/L (ref 45–117)
ALT SERPL-CCNC: 31 U/L (ref 12–78)
AST SERPL-CCNC: 20 U/L (ref 15–37)
BILIRUB SERPL-MCNC: 0.7 MG/DL (ref 0.2–1)
BUN SERPL-MCNC: 20 MG/DL (ref 7–18)
CALCIUM SERPL-MCNC: 8.7 MG/DL (ref 8.5–10.1)
CHLORIDE SERPL-SCNC: 109 MEQ/L (ref 98–107)
CHOLEST SERPL-MCNC: 127 MG/DL (ref 120–200)
CHOLESTEROL/ HDL RATIO: 2.05 RATIO
CREAT SERPL-MCNC: 0.92 MG/DL (ref 0.6–1.3)
ERYTHROCYTE [DISTWIDTH] IN BLOOD BY AUTOMATED COUNT: 14.5 % (ref 11.6–17.2)
GFR SERPLBLD BASED ON 1.73 SQ M-ARVRAT: 78 ML/MIN (ref 89–?)
HCO3 BLD-SCNC: 26.8 MEQ/L (ref 21–32)
HCT VFR BLD CALC: 41.2 % (ref 39–51)
HDLC SERPL-MCNC: 61.8 MG/DL (ref 40–60)
HGB BLD-MCNC: 14 GM/DL (ref 13–17)
LDLC SERPL-MCNC: 40 MG/DL (ref 0–99)
MCH RBC QN AUTO: 32.4 PG (ref 27–34)
MCHC RBC AUTO-ENTMCNC: 34 % (ref 32–36)
MCV RBC AUTO: 95.4 FL (ref 80–100)
PLATELET # BLD: 177 TH/MM3 (ref 150–450)
PMV BLD AUTO: 8.1 FL (ref 7–11)
PROT SERPL-MCNC: 6.5 GM/DL (ref 6.4–8.2)
RBC # BLD AUTO: 4.31 MIL/MM3 (ref 4.5–5.9)
SODIUM SERPL-SCNC: 143 MEQ/L (ref 136–145)
TRIGL SERPL-MCNC: 128 MG/DL (ref 42–150)
WBC # BLD AUTO: 6.4 TH/MM3 (ref 4–11)

## 2018-01-11 PROCEDURE — 85027 COMPLETE CBC AUTOMATED: CPT

## 2018-01-11 PROCEDURE — 84443 ASSAY THYROID STIM HORMONE: CPT

## 2018-01-11 PROCEDURE — 36415 COLL VENOUS BLD VENIPUNCTURE: CPT

## 2018-01-11 PROCEDURE — 80061 LIPID PANEL: CPT

## 2018-01-11 PROCEDURE — 80053 COMPREHEN METABOLIC PANEL: CPT

## 2018-05-05 ENCOUNTER — HOSPITAL ENCOUNTER (EMERGENCY)
Dept: HOSPITAL 17 - PHED | Age: 83
Discharge: HOME | End: 2018-05-05
Payer: MEDICARE

## 2018-05-05 VITALS
SYSTOLIC BLOOD PRESSURE: 119 MMHG | HEART RATE: 84 BPM | DIASTOLIC BLOOD PRESSURE: 62 MMHG | OXYGEN SATURATION: 97 % | RESPIRATION RATE: 18 BRPM

## 2018-05-05 VITALS
SYSTOLIC BLOOD PRESSURE: 129 MMHG | HEART RATE: 85 BPM | TEMPERATURE: 97.6 F | DIASTOLIC BLOOD PRESSURE: 60 MMHG | OXYGEN SATURATION: 98 % | RESPIRATION RATE: 18 BRPM

## 2018-05-05 VITALS — WEIGHT: 147.05 LBS | HEIGHT: 64 IN | BODY MASS INDEX: 25.1 KG/M2

## 2018-05-05 DIAGNOSIS — S09.90XA: ICD-10-CM

## 2018-05-05 DIAGNOSIS — I25.10: ICD-10-CM
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
## 2018-05-05 DIAGNOSIS — Z23: ICD-10-CM

## 2018-05-05 DIAGNOSIS — E78.00: ICD-10-CM

## 2018-05-05 DIAGNOSIS — I10: ICD-10-CM

## 2018-05-05 DIAGNOSIS — I48.91: ICD-10-CM

## 2018-05-05 DIAGNOSIS — S40.011A: Primary | ICD-10-CM

## 2018-05-05 DIAGNOSIS — S42.294A: ICD-10-CM

## 2018-05-05 LAB
INR PPP: 1.8 RATIO
PROTHROMBIN TIME: 18.5 SEC (ref 9.8–11.6)

## 2018-05-05 PROCEDURE — 73030 X-RAY EXAM OF SHOULDER: CPT

## 2018-05-05 PROCEDURE — 70450 CT HEAD/BRAIN W/O DYE: CPT

## 2018-05-05 PROCEDURE — 90471 IMMUNIZATION ADMIN: CPT

## 2018-05-05 PROCEDURE — 90714 TD VACC NO PRESV 7 YRS+ IM: CPT

## 2018-05-05 PROCEDURE — 85610 PROTHROMBIN TIME: CPT

## 2018-05-05 NOTE — RADRPT
EXAM DATE/TIME:  05/05/2018 15:11 

 

[facility] COMPARISON:     

CT BRAIN W/O CONTRAST, June 06, 2013, 14:21.

 

 

INDICATIONS :     

Fell and hit head.

                      

 

RADIATION DOSE:     

57.17 CTDIvol (mGy) 

 

 

 

MEDICAL HISTORY :     

Cardiovascular disease. Hypertension. Anticoagulant therapy.

 

SURGICAL HISTORY :      

CABG 

 

ENCOUNTER:      

Initial

 

ACUITY:      

1 day

 

PAIN SCALE:      

3/10

 

LOCATION:       

Right frontal 

 

TECHNIQUE:     

Multiple contiguous axial images were obtained of the head.  Using automated exposure control and adj
ustment of the mA and/or kV according to patient size, radiation dose was kept as low as reasonably a
chievable to obtain optimal diagnostic quality images.   DICOM format image data is available electro
nically for review and comparison.  

 

FINDINGS:     

 

CEREBRUM:     

The ventricles are normal for age.  No evidence of midline shift, mass lesion, hemorrhage or acute in
farction.  No extra-axial fluid collections are seen.

 

POSTERIOR FOSSA:     

The cerebellum and brainstem are intact.  The 4th ventricle is midline.  The cerebellopontine angle i
s unremarkable.

 

EXTRACRANIAL:     

The visualized portion of the orbits is intact.

 

SKULL:     

The calvaria is intact.  No evidence of skull fracture.

 

CONCLUSION:     

No acute intracranial findings.

 

 

 

 Ra Blankenship MD on May 05, 2018 at 15:53           

Board Certified Radiologist.

 This report was verified electronically.

## 2018-05-05 NOTE — PD
HPI


Chief Complaint:  Head Injury


Time Seen by Provider:  14:03


Travel History


International Travel<30 days:  No


Contact w/Intl Traveler<30days:  No


Traveled to known affect area:  No





History of Present Illness


HPI


88-year-old male came to the emergency room with history of fall and head 

injury.  He says he has history of balance issues and today while walking 

tripped and was unable to control himself.  He fell and hit his head on the 

right side as well as right shoulder.  Patient is on Coumadin which is 

concerning to him with the head injury.  He is here to be checked out.  Patient 

did not lose consciousness before or after.  Vital signs are stable otherwise.  

He does not recall his last tetanus shot.  He has been applying ice pack on the 

head.  Patient says that he has been diagnosed with rotator cuff issues on the 

right shoulder and after falling he thinks he may have aggravated it.





Formerly [facility], later [facility]


Past Medical History


*** Narrative Medical


List of his past medical, surgical, social and family history is reviewed from 

the nursing note.


Hx Anticoagulant Therapy:  Yes


Atrial Fibrillation:  Yes


Heart Rhythm Problems:  Yes (AF on coumadin)


Cardiovascular Problems:  Yes


High Cholesterol:  Yes


Coronary Artery Disease:  Yes


Diminished Hearing:  No


Endocrine:  No


Gastrointestinal Disorders:  Yes


GERD:  Yes


Genitourinary:  Yes


Hypertension:  Yes (controlled on meds)


Immune Disorder:  No


Implanted Vascular Access Dvce:  No


Kidney Stones:  Yes


Musculoskeletal:  Yes


Neurologic:  Yes


Psychiatric:  No


Reproductive:  No


Respiratory:  No


Thyroid Disease:  Yes (on meds unsure of name)


Tetanus Vaccination:  Unknown


Influenza Vaccination:  Yes





Past Surgical History


Abdominal Surgery:  No


Cardiac Surgery:  Yes (5 vessel bypass more than 20 years ago)


Cholecystectomy:  Yes


Coronary Artery Bypass Graft:  Yes (5 arteries)


Eye Surgery:  Yes (bilat cataract removal)


Other Surgery:  Yes (testicular sx)





Social History


Alcohol Use:  No


Tobacco Use:  No


Substance Use:  No





Allergies-Medications


(Allergen,Severity, Reaction):  


Coded Allergies:  


     codeine (Unverified  Allergy, Severe, Nausea/Vomiting, 5/5/18)


Comments


List of his allergies reviewed from the nursing note.


Reported Meds & Prescriptions





Reported Meds & Active Scripts


Active


Hydrocodone-Acetaminophen 5-325 mg Tab 1 Tab PO Q6H PRN


Reported


Lutein 20 Mg Cap 20 Mg PO DAILY


Lorazepam 0.5 Mg Tab 1 Mg PO HS PRN


Klor-Con 10 (Potassium Chloride) 10 Meq Tab 10 Meq PO DAILY


Glucosamine (Glucosamine Sulfate) 500 Mg Cap 500 Mg PO DAILY


Lysine (Lysine HCl) 500 Mg Tab 1 Tab PO DAILY


Warfarin 4 Mg Tab 4 Mg PO DAILY


Levothyroxine (Levothyroxine Sodium) 25 Mcg Tab 50 Mcg PO DAILY


Toprol XL (Metoprolol Succinate) 50 Mg Tab 50 Mg PO DAILY


Vitamin D3 (Cholecalciferol) 1,000 Unit Tab 1,000 Units PO DAILY


Amiodarone (Amiodarone HCl) 200 Mg Tab 100 Mg PO 3 TIMES A WEEK


Ramipril 10 Mg Cap 10 Mg PO DAILY





Narrative Medication


List of his home medications reviewed from the nursing note.





Review of Systems


Except as stated in HPI:  all other systems reviewed are Neg


Musculoskeletal:  Positive: Pain





Physical Exam


Narrative


GENERAL: Awake, alert, mild distress


SKIN: Focused skin assessment warm/dry.


HEAD: Superficial abrasion and contusion on the right frontal aspect


EYES: Pupils equal and round. No scleral icterus. No injection or drainage. 


ENT: No nasal bleeding or discharge.  Mucous membranes pink and moist.


NECK: Trachea midline. No JVD. 


CARDIOVASCULAR: Regular rate and rhythm.  No murmur appreciated.


RESPIRATORY: No accessory muscle use. Clear to auscultation. Breath sounds 

equal bilaterally. 


GASTROINTESTINAL: Abdomen soft, non-tender, nondistended. Hepatic and splenic 

margins not palpable. 


MUSCULOSKELETAL: No obvious deformities. No clubbing.  No cyanosis.  No edema.  

Crepitus of the right shoulder joint with some limited range of motion of the 

shoulder joint secondary to the pain


NEUROLOGICAL: Awake and alert. No obvious cranial nerve deficits.  Motor 

grossly within normal limits. Normal speech.


PSYCHIATRIC: Appropriate mood and affect; insight and judgment normal.





Data


Data


Last Documented VS





Orders





 Orders


Ct Brain W/O Iv Contrast(Rout) (5/5/18 )


Shoulder, Complete (>2vws) (5/5/18 )


Prothrombin Time / Inr (Pt) (5/5/18 14:20)


Tetanus/Diphtheria Tox Adult (Tetanus/Di (5/5/18 14:30)


Sling Cradle Arm (5/5/18 )


Ed Discharge Order (5/5/18 16:35)


Sling Cradle Arm (5/5/18 )





Labs





Laboratory Tests








Test


  5/5/18


14:40


 


Prothrombin Time 18.5 SEC 


 


Prothromb Time International


Ratio 1.8 RATIO 


 











MDM


Medical Decision Making


Medical Screen Exam Complete:  Yes


Emergency Medical Condition:  Yes


Medical Record Reviewed:  Yes


Differential Diagnosis


Intracranial bleed, shoulder fracture, contusion, shoulder strain


Narrative Course


2:30 PM awaiting further CAT scan of the brain as well as x-ray of the 

shoulder.  I have ordered INR to be checked as well.  Patient will be getting 

tetanus shot.





4:09 PM head CT is within normal limit.  Awaiting for the shoulder x-ray to be 

read.  Patient will get an arm sling.  He will be discharged home.





4:18 PM there is a humeral head fracture that is nondisplaced.  I will discuss 

with the orthopedist on call but patient should be able to go home with the arm 

sling.





Procedures


EKG Prior to Arrival:  No





Diagnosis





 Primary Impression:  


 Fall


 Qualified Codes:  W19.XXXA - Unspecified fall, initial encounter


 Additional Impressions:  


 Shoulder contusion


 Qualified Codes:  S40.011A - Contusion of right shoulder, initial encounter


 Head injury


 Qualified Codes:  S09.90XA - Unspecified injury of head, initial encounter


 Humerus head fracture


 Qualified Codes:  S42.291A - Other displaced fracture of upper end of right 

humerus, initial encounter for closed fracture


Referrals:  


Demetris Patel MD


3 days





Primary Care Physician





***Additional Instructions:  


Keep the shoulder sling on until you have been seen by your primary care.  

However should not be on for more than 3-4 days.  Apply ice pack to keep the 

swelling down.  Return to the ER if condition worsens or any other new 

concerns.  Call the orthopedist office was name and number been provided to 

you.  Follow-up with him as per the appointment.


***Med/Other Pt SpecificInfo:  Prescription(s) given


Scripts


Hydrocodone-Acetaminophen (Hydrocodone-Acetaminophen) 5-325 mg Tab


1 TAB PO Q6H Y for PAIN, #10 TAB 0 Refills


   Prov: Jeremias Flores MD         5/5/18


Disposition:  01 DISCHARGE HOME


Condition:  Stable











Jeremias Flores MD May 5, 2018 14:13

## 2018-05-05 NOTE — RADRPT
EXAM DATE/TIME:  05/05/2018 15:13 

 

[facility] COMPARISON:     

No previous studies available for comparison.

 

                     

INDICATIONS :     

Pain due to fall.

                     

 

MEDICAL HISTORY :            

Cardiovascular disease. Hypertension. Gastroesophageal reflux disease.afib   

 

SURGICAL HISTORY :        

CABG Testicular

 

ENCOUNTER:     

Initial                                        

 

ACUITY:     

1 day      

 

PAIN SCORE:     

5/10

 

LOCATION:     

Right upper extremity shoulder

 

FINDINGS:     

Or views of the right shoulder. Vertical lucency is seen at the medial right humeral head. Finding is
 suspicious for either a posterior glenoid fracture or medial humeral head fracture. High riding hume
ral head suggesting rotator cuff insufficiency.

 

CONCLUSION:     

1. Vertical lucency suspicious for posterior glenoid fracture or medial humeral head fracture.

2. High riding humeral head abutting the undersurface of the acromion indicating rotator cuff tendon 
tear, age indeterminate. 

 

 

 Ra Blankenship MD on May 05, 2018 at 16:10           

Board Certified Radiologist.

 This report was verified electronically.

## 2018-05-18 ENCOUNTER — HOSPITAL ENCOUNTER (OUTPATIENT)
Age: 83
End: 2018-05-18

## 2018-05-18 DIAGNOSIS — R60.0: ICD-10-CM

## 2018-05-18 DIAGNOSIS — I48.0: ICD-10-CM

## 2018-05-18 DIAGNOSIS — I11.9: ICD-10-CM

## 2018-05-18 DIAGNOSIS — I65.23: ICD-10-CM

## 2018-05-18 DIAGNOSIS — Z79.899: ICD-10-CM

## 2018-05-18 DIAGNOSIS — E78.2: Primary | ICD-10-CM

## 2018-05-18 DIAGNOSIS — I25.10: ICD-10-CM

## 2022-04-28 NOTE — MH
cc:

HELDER BULLARD

****

 

DATE OF ADMISSION:  2/7/2017

 

DATE OF BIRTH

1929

 

HISTORY

This is an 87-year-old male who tripped, falling onto his left side.  He was

evaluated at [facility], found to have scapular fracture, rib

fractures and a small apical pneumothorax.  He was transferred to [facility]

for trauma eval.  The patient's complains are of pain on his left side.  No

shortness of breath, no headaches.  Denies loss of consciousness.  No abdominal

pain.  No paresthesias.

 

PAST MEDICAL HISTORY

Significant for -

1. Atrial fibrillation.

2. Hypertension.

3. Hypothyroidism.

 

SURGICAL HISTORY

Significant for -

1. Cholecystectomy.

2. CABG.

3. Eye surgery.

 

ALLERGIES

CODEINE.

 

MEDICATIONS

He is on multiple medications including -

1. Warfarin.

2. Lorazepam.

3. Levothyroxine.

4. Toprol.

5. Amiodarone.

6. Ramipril.

 

SOCIAL HISTORY

He does not smoke or drink alcohol.

 

FAMILY HISTORY

Noncontributory.

 

REVIEW OF SYSTEMS

Significant for above.  All other 10-point review negative.

 

PHYSICAL EXAMINATION

GENERAL:  On exam he is laying in bed in no acute distress.

HEENT:  His pupils are 3, equal and reactive.

NECK:  His trachea is midline.

LUNGS:  Respirations clear.

EXTREMITIES:  Left upper extremity in sling.

CHEST:  Tenderness to palpation in his chest, no crepitus.

GASTROINTESTINAL:  Soft, nondistended.

NEUROLOGICAL:  Nonfocal.

MUSCULOSKELETAL:  No deformities.

 

RADIOLOGICAL IMAGES

The patient had a CAT scan of his shoulder of his upper extremities which

revealed shoulder fracture.

 

He had a rib x-rays that showed left-sided eighth and ninth rib fracture.

 

Repeat chest x-ray on 02/08 - No evidence of pneumothorax.

 

ASSESSMENT

This is a patient status post fall on anticoagulant with a small pneumothorax

that has resolved on followup x-ray.  He has rib fractures and scapular

fracture.

 

PLAN

1. Orthopedics has been consulted.

2. [facility] has seen the patient and is following him for his primary medical

   problems.  Awaiting orthopedics and will provide pain management.

3. Monitor pulmonary status.

 

 

 

                               __________________________________

                           MD BERNY Roman/KERRI

D:  2/9/2017/5:49 AM

T:  2/9/2017/6:20 AM

Visit #:  [id number]

Job #:  [id number]
show